# Patient Record
Sex: MALE | Race: WHITE | Employment: FULL TIME | URBAN - METROPOLITAN AREA
[De-identification: names, ages, dates, MRNs, and addresses within clinical notes are randomized per-mention and may not be internally consistent; named-entity substitution may affect disease eponyms.]

---

## 2019-10-23 ENCOUNTER — TRANSFERRED RECORDS (OUTPATIENT)
Dept: HEALTH INFORMATION MANAGEMENT | Facility: CLINIC | Age: 39
End: 2019-10-23

## 2019-10-24 ENCOUNTER — TRANSFERRED RECORDS (OUTPATIENT)
Dept: HEALTH INFORMATION MANAGEMENT | Facility: CLINIC | Age: 39
End: 2019-10-24

## 2019-10-25 ENCOUNTER — MEDICAL CORRESPONDENCE (OUTPATIENT)
Dept: HEALTH INFORMATION MANAGEMENT | Facility: CLINIC | Age: 39
End: 2019-10-25

## 2019-10-25 ENCOUNTER — TRANSFERRED RECORDS (OUTPATIENT)
Dept: HEALTH INFORMATION MANAGEMENT | Facility: CLINIC | Age: 39
End: 2019-10-25

## 2019-10-31 ENCOUNTER — PATIENT OUTREACH (OUTPATIENT)
Dept: NEUROSURGERY | Facility: CLINIC | Age: 39
End: 2019-10-31

## 2019-10-31 ENCOUNTER — OFFICE VISIT (OUTPATIENT)
Dept: NEUROSURGERY | Facility: CLINIC | Age: 39
End: 2019-10-31
Payer: COMMERCIAL

## 2019-10-31 VITALS
HEIGHT: 78 IN | BODY MASS INDEX: 21.52 KG/M2 | HEART RATE: 52 BPM | OXYGEN SATURATION: 100 % | DIASTOLIC BLOOD PRESSURE: 66 MMHG | WEIGHT: 186 LBS | SYSTOLIC BLOOD PRESSURE: 109 MMHG

## 2019-10-31 DIAGNOSIS — D35.2 PITUITARY MACROADENOMA (H): ICD-10-CM

## 2019-10-31 DIAGNOSIS — H49.21 SIXTH NERVE PALSY OF RIGHT EYE: ICD-10-CM

## 2019-10-31 DIAGNOSIS — E03.9 HYPOTHYROIDISM, UNSPECIFIED TYPE: Primary | ICD-10-CM

## 2019-10-31 RX ORDER — DEXAMETHASONE 2 MG/1
1 TABLET ORAL EVERY 6 HOURS
Refills: 1 | COMMUNITY
Start: 2019-10-25 | End: 2020-01-09

## 2019-10-31 RX ORDER — IBUPROFEN 200 MG
200 TABLET ORAL EVERY 4 HOURS PRN
Status: ON HOLD | COMMUNITY
End: 2019-11-04

## 2019-10-31 RX ORDER — ACETAMINOPHEN 500 MG
500-1000 TABLET ORAL EVERY 6 HOURS PRN
COMMUNITY

## 2019-10-31 RX ORDER — LEVOTHYROXINE SODIUM 50 UG/1
50 TABLET ORAL DAILY
Qty: 30 TABLET | Refills: 0 | Status: SHIPPED | OUTPATIENT
Start: 2019-10-31 | End: 2019-12-03

## 2019-10-31 SDOH — HEALTH STABILITY: MENTAL HEALTH: HOW OFTEN DO YOU HAVE A DRINK CONTAINING ALCOHOL?: 2-4 TIMES A MONTH

## 2019-10-31 SDOH — HEALTH STABILITY: MENTAL HEALTH: HOW MANY STANDARD DRINKS CONTAINING ALCOHOL DO YOU HAVE ON A TYPICAL DAY?: 5 OR 6

## 2019-10-31 SDOH — HEALTH STABILITY: MENTAL HEALTH: HOW MANY DRINKS CONTAINING ALCOHOL DO YOU HAVE ON A TYPICAL DAY WHEN YOU ARE DRINKING?: 5 OR 6

## 2019-10-31 ASSESSMENT — ENCOUNTER SYMPTOMS
ALTERED TEMPERATURE REGULATION: 0
WEIGHT GAIN: 0
INCREASED ENERGY: 0
LOSS OF CONSCIOUSNESS: 0
FATIGUE: 0
NUMBNESS: 0
PARALYSIS: 0
EYE IRRITATION: 1
HALLUCINATIONS: 0
POLYDIPSIA: 0
CHILLS: 0
EYE REDNESS: 0
TREMORS: 0
SEIZURES: 0
NIGHT SWEATS: 1
DISTURBANCES IN COORDINATION: 1
WEAKNESS: 0
FEVER: 0
MEMORY LOSS: 0
EYE WATERING: 0
TINGLING: 0
SPEECH CHANGE: 0
POLYPHAGIA: 0
DOUBLE VISION: 1
EYE PAIN: 1
HEADACHES: 1
WEIGHT LOSS: 0
DECREASED APPETITE: 1
DIZZINESS: 1

## 2019-10-31 ASSESSMENT — MIFFLIN-ST. JEOR: SCORE: 1895.91

## 2019-10-31 ASSESSMENT — PAIN SCALES - GENERAL: PAINLEVEL: NO PAIN (0)

## 2019-10-31 NOTE — NURSING NOTE
Chief Complaint   Patient presents with     Consult     [facility] NEW - PITUITARY TUMOR       Luis Felipe Choe, EMT

## 2019-10-31 NOTE — PROGRESS NOTES
Pre-op Teaching    Procedure: Pituitary resection  Planned Surgery Date: Unknown.  Either 11/1,2, or 3  Surgeon: Mikie                Discussed pre-op routine and requirements to include:  surgical procedure, post-op recovery and expectations, need for H&P, NPO prior to OR, pre-op antibacterial showers, pain control and importance of follow-up visits.  Surgery scheduling will coordinate OR time/date and update patient as appropriate.  The Pre-Admission Office will call to re-inforce instructions 24-48 hours prior to surgery. The Pre-Admission contact number is [phone], [phone] M-F.      Ample time was provided for patient questions and in-depth discussion of topics of heightened interest.  Reviewed medication list and provided instructions regarding what medications to stop prior to surgery: Ibuprofen.  MD prescribed Synthroid 50mcg to be taken daily.  Medication was e-prescribed to pt pharmacy in [place].  Pt aware.   Anti-bacterial soap solution for pre-op showers will be provided at PAC visit as well as specific instructions for use. Approximately 20 minutes spent with patient/family discussing and reviewing.      Patient provided triage contact number for questions or concerns that may arise prior to surgery. Pre-op folder with specific written instructions given to patient for review.

## 2019-10-31 NOTE — LETTER
"10/31/2019       RE: Luisito Asher  [address] [place] [address]     Dear Colleague,    Thank you for referring your patient, Luisito Asher, to the [facility] NEUROSURGERY at [facility]. Please see a copy of my visit note below.      Dear Dr. Cullen:    We saw Mr. Asher in Pituitary Clinic today for evaluation of a pituitary tumor and acute symptoms. He is here today with his wife. In summary, he is a 39 year old right handed man who awoke with sudden onset of headache one week ago, There was associated nausea but no vomiting. The headache was mostly in the temples and there was pressure behind the right eye. About two days later, he developed double vision which he describes as \"freezing\" of the eyes. He was seen in an ED when he developed the severe headache and imaging was performed. It is not clear if he had a cranial nerve palsy at the time of the ED visit. He was then seen by neurosurgery at [facility] and follow up studies and outpatient follow up was apparently recommended. He has been on Tylenol, ibuprofen, dexamethasone.     His headache has improved since last week. The nausea has resolved. His appetite is good. He has deliberately lost 35 lbs over the past year on a \"Keto\" diet. He has been increasingly fatigued over the past month. His libido has been low for several months. He still is able to have erections. He describes feeling cold more often recently.     He denies peripheral vision loss. He has some visual blurring on the right.     PMH: Right knee MCL/ACL repair, tonsillectomy, wisdom tooth extraction, bilateral LASIK    FH: No known intracranial tumors    Social: He lives in [place], [place]. He is  and has three children. 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    On exam, his general appearance is notable for a right eye patch. He is thin. He has a receding hair line. He appears comfortable and has no photophobia. He " has a right sixth nerve palsy. No ptosis or proptosis. Pupils are equal and reactive. No APD. Face is symmetric. Speech and language are normal. He has normal strength and coordination throughout. Visual fields are full on confrontation.    We reviewed his MRI with him and with wife. He has a hypoenhancing mass in the sella with suprasellar and right parasellar extension. There is some compression of the right parasellar space. There is contact with but not marked compression of the optic apparatus. The floor of the sella is expanded. The tumor is less than 3 cm in size. The gland appears to be displaced superiorly, posteriorly, and to the left.     His testosterone is low at 70. Free T4 is mildly low at 0.6. Prolactin 4.5; IGF is mildly elevated at 301     Impression/Plan: This patient has a symptomatic pituitary adenoma. 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 He has mild hypothyroidism and hypogonadism. Although he has mildly elevated IGF-1, he does not have acromegaly. We recommend urgent resection of the tumor through an endoscopic, transnasal approach. We discussed favorable visual prognosis since his onset was only a few days ago and the other cranial nerves are not involved. His double vision may take several weeks or months to improve. His best chance at recovery is early surgery.     We discussed the risks of surgery including death, carotid injury, stroke, hemorrhage, failure to improve, CSF leak, panhypopituitarism, need for reoperation, and he agrees to proceed. We will start him on a low dose of levothyroxine.    We will aim for surgery tomorrow. We will keep you informed of his progress. Please do not hesitate to contact us with questions.    KATI MORRELL MD

## 2019-11-01 ENCOUNTER — APPOINTMENT (OUTPATIENT)
Dept: CT IMAGING | Facility: CLINIC | Age: 39
End: 2019-11-01
Attending: NEUROLOGICAL SURGERY
Payer: COMMERCIAL

## 2019-11-01 ENCOUNTER — ANESTHESIA (OUTPATIENT)
Dept: SURGERY | Facility: CLINIC | Age: 39
End: 2019-11-01
Payer: COMMERCIAL

## 2019-11-01 ENCOUNTER — PATIENT OUTREACH (OUTPATIENT)
Dept: NEUROSURGERY | Facility: CLINIC | Age: 39
End: 2019-11-01

## 2019-11-01 ENCOUNTER — HOSPITAL ENCOUNTER (INPATIENT)
Facility: CLINIC | Age: 39
LOS: 3 days | Discharge: HOME | End: 2019-11-04
Attending: NEUROLOGICAL SURGERY | Admitting: NEUROLOGICAL SURGERY
Payer: COMMERCIAL

## 2019-11-01 ENCOUNTER — ANESTHESIA EVENT (OUTPATIENT)
Dept: SURGERY | Facility: CLINIC | Age: 39
End: 2019-11-01
Payer: COMMERCIAL

## 2019-11-01 DIAGNOSIS — D35.3 BENIGN NEOPLASM OF PITUITARY GLAND AND CRANIOPHARYNGEAL DUCT (POUCH) (H): Primary | ICD-10-CM

## 2019-11-01 DIAGNOSIS — E87.1 HYPONATREMIA: ICD-10-CM

## 2019-11-01 DIAGNOSIS — D49.7 PITUITARY TUMOR: Primary | ICD-10-CM

## 2019-11-01 DIAGNOSIS — D35.2 BENIGN NEOPLASM OF PITUITARY GLAND AND CRANIOPHARYNGEAL DUCT (POUCH) (H): Primary | ICD-10-CM

## 2019-11-01 LAB
ABO + RH BLD: NORMAL
ABO + RH BLD: NORMAL
ANION GAP SERPL CALCULATED.3IONS-SCNC: 5 MMOL/L (ref 3–14)
APTT PPP: 30 SEC (ref 22–37)
BLD GP AB SCN SERPL QL: NORMAL
BLOOD BANK CMNT PATIENT-IMP: NORMAL
BUN SERPL-MCNC: 29 MG/DL (ref 7–30)
CALCIUM SERPL-MCNC: 9.1 MG/DL (ref 8.5–10.1)
CHLORIDE SERPL-SCNC: 105 MMOL/L (ref 94–109)
CO2 SERPL-SCNC: 27 MMOL/L (ref 20–32)
CREAT SERPL-MCNC: 0.98 MG/DL (ref 0.66–1.25)
ERYTHROCYTE [DISTWIDTH] IN BLOOD BY AUTOMATED COUNT: 13 % (ref 10–15)
GFR SERPL CREATININE-BSD FRML MDRD: >90 ML/MIN/{1.73_M2}
GLUCOSE BLDC GLUCOMTR-MCNC: 80 MG/DL (ref 70–99)
GLUCOSE SERPL-MCNC: 85 MG/DL (ref 70–99)
HCT VFR BLD AUTO: 43 % (ref 40–53)
HGB BLD-MCNC: 14.2 G/DL (ref 13.3–17.7)
INR PPP: 1.03 (ref 0.86–1.14)
MCH RBC QN AUTO: 29.2 PG (ref 26.5–33)
MCHC RBC AUTO-ENTMCNC: 33 G/DL (ref 31.5–36.5)
MCV RBC AUTO: 88 FL (ref 78–100)
PLATELET # BLD AUTO: 254 10E9/L (ref 150–450)
POTASSIUM SERPL-SCNC: 3.9 MMOL/L (ref 3.4–5.3)
RBC # BLD AUTO: 4.87 10E12/L (ref 4.4–5.9)
SODIUM SERPL-SCNC: 137 MMOL/L (ref 133–144)
SPECIMEN EXP DATE BLD: NORMAL
WBC # BLD AUTO: 12 10E9/L (ref 4–11)

## 2019-11-01 PROCEDURE — 80048 BASIC METABOLIC PNL TOTAL CA: CPT | Performed by: NEUROLOGICAL SURGERY

## 2019-11-01 PROCEDURE — 0GB04ZZ EXCISION OF PITUITARY GLAND, PERCUTANEOUS ENDOSCOPIC APPROACH: ICD-10-PCS | Performed by: NEUROLOGICAL SURGERY

## 2019-11-01 PROCEDURE — 25000128 H RX IP 250 OP 636: Performed by: NURSE ANESTHETIST, CERTIFIED REGISTERED

## 2019-11-01 PROCEDURE — 25800030 ZZH RX IP 258 OP 636: Performed by: NURSE ANESTHETIST, CERTIFIED REGISTERED

## 2019-11-01 PROCEDURE — 37000009 ZZH ANESTHESIA TECHNICAL FEE, EACH ADDTL 15 MIN: Performed by: NEUROLOGICAL SURGERY

## 2019-11-01 PROCEDURE — 27210794 ZZH OR GENERAL SUPPLY STERILE: Performed by: NEUROLOGICAL SURGERY

## 2019-11-01 PROCEDURE — 25800025 ZZH RX 258: Performed by: NEUROLOGICAL SURGERY

## 2019-11-01 PROCEDURE — 88342 IMHCHEM/IMCYTCHM 1ST ANTB: CPT | Performed by: NEUROLOGICAL SURGERY

## 2019-11-01 PROCEDURE — 27210995 ZZH RX 272: Performed by: NEUROLOGICAL SURGERY

## 2019-11-01 PROCEDURE — 36415 COLL VENOUS BLD VENIPUNCTURE: CPT | Performed by: NEUROLOGICAL SURGERY

## 2019-11-01 PROCEDURE — 00000146 ZZHCL STATISTIC GLUCOSE BY METER IP

## 2019-11-01 PROCEDURE — 70460 CT HEAD/BRAIN W/DYE: CPT

## 2019-11-01 PROCEDURE — 25000128 H RX IP 250 OP 636: Performed by: STUDENT IN AN ORGANIZED HEALTH CARE EDUCATION/TRAINING PROGRAM

## 2019-11-01 PROCEDURE — 25000125 ZZHC RX 250: Performed by: OTOLARYNGOLOGY

## 2019-11-01 PROCEDURE — C1762 CONN TISS, HUMAN(INC FASCIA): HCPCS | Performed by: NEUROLOGICAL SURGERY

## 2019-11-01 PROCEDURE — 40000014 ZZH STATISTIC ARTERIAL MONITORING DAILY

## 2019-11-01 PROCEDURE — 86900 BLOOD TYPING SEROLOGIC ABO: CPT | Performed by: ANESTHESIOLOGY

## 2019-11-01 PROCEDURE — 85610 PROTHROMBIN TIME: CPT | Performed by: NEUROLOGICAL SURGERY

## 2019-11-01 PROCEDURE — 88305 TISSUE EXAM BY PATHOLOGIST: CPT | Performed by: NEUROLOGICAL SURGERY

## 2019-11-01 PROCEDURE — 36000074 ZZH SURGERY LEVEL 6 1ST 30 MIN - UMMC: Performed by: NEUROLOGICAL SURGERY

## 2019-11-01 PROCEDURE — 86901 BLOOD TYPING SEROLOGIC RH(D): CPT | Performed by: ANESTHESIOLOGY

## 2019-11-01 PROCEDURE — 25000125 ZZHC RX 250: Performed by: STUDENT IN AN ORGANIZED HEALTH CARE EDUCATION/TRAINING PROGRAM

## 2019-11-01 PROCEDURE — 71000014 ZZH RECOVERY PHASE 1 LEVEL 2 FIRST HR: Performed by: NEUROLOGICAL SURGERY

## 2019-11-01 PROCEDURE — 36000076 ZZH SURGERY LEVEL 6 EA 15 ADDTL MIN - UMMC: Performed by: NEUROLOGICAL SURGERY

## 2019-11-01 PROCEDURE — 25000125 ZZHC RX 250: Performed by: NURSE ANESTHETIST, CERTIFIED REGISTERED

## 2019-11-01 PROCEDURE — 27210995 ZZH RX 272: Performed by: OTOLARYNGOLOGY

## 2019-11-01 PROCEDURE — 40000275 ZZH STATISTIC RCP TIME EA 10 MIN

## 2019-11-01 PROCEDURE — 25000128 H RX IP 250 OP 636: Performed by: NEUROLOGICAL SURGERY

## 2019-11-01 PROCEDURE — 88341 IMHCHEM/IMCYTCHM EA ADD ANTB: CPT | Performed by: NEUROLOGICAL SURGERY

## 2019-11-01 PROCEDURE — 71000015 ZZH RECOVERY PHASE 1 LEVEL 2 EA ADDTL HR: Performed by: NEUROLOGICAL SURGERY

## 2019-11-01 PROCEDURE — 88313 SPECIAL STAINS GROUP 2: CPT | Performed by: NEUROLOGICAL SURGERY

## 2019-11-01 PROCEDURE — 12000001 ZZH R&B MED SURG/OB UMMC

## 2019-11-01 PROCEDURE — 37000008 ZZH ANESTHESIA TECHNICAL FEE, 1ST 30 MIN: Performed by: NEUROLOGICAL SURGERY

## 2019-11-01 PROCEDURE — 85730 THROMBOPLASTIN TIME PARTIAL: CPT | Performed by: NEUROLOGICAL SURGERY

## 2019-11-01 PROCEDURE — 86850 RBC ANTIBODY SCREEN: CPT | Performed by: ANESTHESIOLOGY

## 2019-11-01 PROCEDURE — 25000565 ZZH ISOFLURANE, EA 15 MIN: Performed by: NEUROLOGICAL SURGERY

## 2019-11-01 PROCEDURE — 40000170 ZZH STATISTIC PRE-PROCEDURE ASSESSMENT II: Performed by: NEUROLOGICAL SURGERY

## 2019-11-01 PROCEDURE — 85027 COMPLETE CBC AUTOMATED: CPT | Performed by: NEUROLOGICAL SURGERY

## 2019-11-01 DEVICE — GRAFT DUREPAIR DURA MATRIX 2X2" 62100: Type: IMPLANTABLE DEVICE | Site: BRAIN | Status: FUNCTIONAL

## 2019-11-01 RX ORDER — PROPOFOL 10 MG/ML
INJECTION, EMULSION INTRAVENOUS CONTINUOUS PRN
Status: DISCONTINUED | OUTPATIENT
Start: 2019-11-01 | End: 2019-11-01

## 2019-11-01 RX ORDER — LABETALOL HYDROCHLORIDE 5 MG/ML
10-40 INJECTION, SOLUTION INTRAVENOUS EVERY 10 MIN PRN
Status: DISCONTINUED | OUTPATIENT
Start: 2019-11-01 | End: 2019-11-04 | Stop reason: HOSPADM

## 2019-11-01 RX ORDER — ACETAMINOPHEN 325 MG/1
975 TABLET ORAL EVERY 8 HOURS
Status: DISCONTINUED | OUTPATIENT
Start: 2019-11-01 | End: 2019-11-04 | Stop reason: HOSPADM

## 2019-11-01 RX ORDER — HYDROMORPHONE HYDROCHLORIDE 1 MG/ML
.3-.5 INJECTION, SOLUTION INTRAMUSCULAR; INTRAVENOUS; SUBCUTANEOUS EVERY 5 MIN PRN
Status: DISCONTINUED | OUTPATIENT
Start: 2019-11-01 | End: 2019-11-02

## 2019-11-01 RX ORDER — POTASSIUM CHLORIDE 7.45 MG/ML
10 INJECTION INTRAVENOUS
Status: DISCONTINUED | OUTPATIENT
Start: 2019-11-01 | End: 2019-11-04 | Stop reason: HOSPADM

## 2019-11-01 RX ORDER — LIDOCAINE HYDROCHLORIDE AND EPINEPHRINE 10; 10 MG/ML; UG/ML
INJECTION, SOLUTION INFILTRATION; PERINEURAL PRN
Status: DISCONTINUED | OUTPATIENT
Start: 2019-11-01 | End: 2019-11-02 | Stop reason: HOSPADM

## 2019-11-01 RX ORDER — AMOXICILLIN 250 MG
2 CAPSULE ORAL 2 TIMES DAILY
Status: DISCONTINUED | OUTPATIENT
Start: 2019-11-01 | End: 2019-11-04 | Stop reason: HOSPADM

## 2019-11-01 RX ORDER — POTASSIUM CL/LIDO/0.9 % NACL 10MEQ/0.1L
10 INTRAVENOUS SOLUTION, PIGGYBACK (ML) INTRAVENOUS
Status: DISCONTINUED | OUTPATIENT
Start: 2019-11-01 | End: 2019-11-04 | Stop reason: HOSPADM

## 2019-11-01 RX ORDER — GLYCOPYRROLATE 0.2 MG/ML
INJECTION, SOLUTION INTRAMUSCULAR; INTRAVENOUS PRN
Status: DISCONTINUED | OUTPATIENT
Start: 2019-11-01 | End: 2019-11-01

## 2019-11-01 RX ORDER — FENTANYL CITRATE 50 UG/ML
25-50 INJECTION, SOLUTION INTRAMUSCULAR; INTRAVENOUS
Status: DISCONTINUED | OUTPATIENT
Start: 2019-11-01 | End: 2019-11-04 | Stop reason: HOSPADM

## 2019-11-01 RX ORDER — SODIUM CHLORIDE 9 MG/ML
INJECTION, SOLUTION INTRAVENOUS CONTINUOUS
Status: ACTIVE | OUTPATIENT
Start: 2019-11-01 | End: 2019-11-02

## 2019-11-01 RX ORDER — AMOXICILLIN 250 MG
1 CAPSULE ORAL 2 TIMES DAILY
Status: DISCONTINUED | OUTPATIENT
Start: 2019-11-01 | End: 2019-11-04 | Stop reason: HOSPADM

## 2019-11-01 RX ORDER — PROCHLORPERAZINE MALEATE 10 MG
10 TABLET ORAL EVERY 6 HOURS PRN
Status: DISCONTINUED | OUTPATIENT
Start: 2019-11-01 | End: 2019-11-04 | Stop reason: HOSPADM

## 2019-11-01 RX ORDER — ONDANSETRON 4 MG/1
4 TABLET, ORALLY DISINTEGRATING ORAL EVERY 30 MIN PRN
Status: DISCONTINUED | OUTPATIENT
Start: 2019-11-01 | End: 2019-11-02

## 2019-11-01 RX ORDER — ONDANSETRON 2 MG/ML
4 INJECTION INTRAMUSCULAR; INTRAVENOUS EVERY 30 MIN PRN
Status: DISCONTINUED | OUTPATIENT
Start: 2019-11-01 | End: 2019-11-02

## 2019-11-01 RX ORDER — NALOXONE HYDROCHLORIDE 0.4 MG/ML
.1-.4 INJECTION, SOLUTION INTRAMUSCULAR; INTRAVENOUS; SUBCUTANEOUS
Status: DISCONTINUED | OUTPATIENT
Start: 2019-11-01 | End: 2019-11-04 | Stop reason: HOSPADM

## 2019-11-01 RX ORDER — LEVOTHYROXINE SODIUM 50 UG/1
50 TABLET ORAL DAILY
Status: DISCONTINUED | OUTPATIENT
Start: 2019-11-02 | End: 2019-11-04 | Stop reason: HOSPADM

## 2019-11-01 RX ORDER — DEXAMETHASONE 2 MG/1
2 TABLET ORAL EVERY 6 HOURS
Status: DISCONTINUED | OUTPATIENT
Start: 2019-11-01 | End: 2019-11-04 | Stop reason: HOSPADM

## 2019-11-01 RX ORDER — LIDOCAINE HYDROCHLORIDE 20 MG/ML
INJECTION, SOLUTION INFILTRATION; PERINEURAL PRN
Status: DISCONTINUED | OUTPATIENT
Start: 2019-11-01 | End: 2019-11-01

## 2019-11-01 RX ORDER — POTASSIUM CHLORIDE 750 MG/1
20-40 TABLET, EXTENDED RELEASE ORAL
Status: DISCONTINUED | OUTPATIENT
Start: 2019-11-01 | End: 2019-11-04 | Stop reason: HOSPADM

## 2019-11-01 RX ORDER — MEPERIDINE HYDROCHLORIDE 25 MG/ML
12.5 INJECTION INTRAMUSCULAR; INTRAVENOUS; SUBCUTANEOUS EVERY 5 MIN PRN
Status: DISCONTINUED | OUTPATIENT
Start: 2019-11-01 | End: 2019-11-04 | Stop reason: HOSPADM

## 2019-11-01 RX ORDER — ACETAMINOPHEN 325 MG/1
975 TABLET ORAL ONCE
Status: COMPLETED | OUTPATIENT
Start: 2019-11-01 | End: 2019-11-02

## 2019-11-01 RX ORDER — SODIUM CHLORIDE, SODIUM LACTATE, POTASSIUM CHLORIDE, CALCIUM CHLORIDE 600; 310; 30; 20 MG/100ML; MG/100ML; MG/100ML; MG/100ML
INJECTION, SOLUTION INTRAVENOUS CONTINUOUS PRN
Status: DISCONTINUED | OUTPATIENT
Start: 2019-11-01 | End: 2019-11-01

## 2019-11-01 RX ORDER — OXYMETAZOLINE HYDROCHLORIDE 0.05 G/100ML
SPRAY NASAL PRN
Status: DISCONTINUED | OUTPATIENT
Start: 2019-11-01 | End: 2019-11-02 | Stop reason: HOSPADM

## 2019-11-01 RX ORDER — SODIUM CHLORIDE, SODIUM LACTATE, POTASSIUM CHLORIDE, CALCIUM CHLORIDE 600; 310; 30; 20 MG/100ML; MG/100ML; MG/100ML; MG/100ML
INJECTION, SOLUTION INTRAVENOUS CONTINUOUS
Status: DISCONTINUED | OUTPATIENT
Start: 2019-11-01 | End: 2019-11-04 | Stop reason: HOSPADM

## 2019-11-01 RX ORDER — HYDRALAZINE HYDROCHLORIDE 20 MG/ML
10-20 INJECTION INTRAMUSCULAR; INTRAVENOUS EVERY 30 MIN PRN
Status: DISCONTINUED | OUTPATIENT
Start: 2019-11-01 | End: 2019-11-04 | Stop reason: HOSPADM

## 2019-11-01 RX ORDER — DIMENHYDRINATE 50 MG/ML
25 INJECTION, SOLUTION INTRAMUSCULAR; INTRAVENOUS
Status: DISCONTINUED | OUTPATIENT
Start: 2019-11-01 | End: 2019-11-04 | Stop reason: HOSPADM

## 2019-11-01 RX ORDER — POTASSIUM CHLORIDE 1.5 G/1.58G
20-40 POWDER, FOR SOLUTION ORAL
Status: DISCONTINUED | OUTPATIENT
Start: 2019-11-01 | End: 2019-11-04 | Stop reason: HOSPADM

## 2019-11-01 RX ORDER — PANTOPRAZOLE SODIUM 40 MG/1
40 TABLET, DELAYED RELEASE ORAL
Status: DISCONTINUED | OUTPATIENT
Start: 2019-11-02 | End: 2019-11-04 | Stop reason: HOSPADM

## 2019-11-01 RX ORDER — LIDOCAINE 40 MG/G
CREAM TOPICAL
Status: DISCONTINUED | OUTPATIENT
Start: 2019-11-01 | End: 2019-11-04 | Stop reason: HOSPADM

## 2019-11-01 RX ORDER — DEXAMETHASONE SODIUM PHOSPHATE 4 MG/ML
4 INJECTION, SOLUTION INTRA-ARTICULAR; INTRALESIONAL; INTRAMUSCULAR; INTRAVENOUS; SOFT TISSUE
Status: DISCONTINUED | OUTPATIENT
Start: 2019-11-01 | End: 2019-11-04 | Stop reason: HOSPADM

## 2019-11-01 RX ORDER — ONDANSETRON 4 MG/1
4 TABLET, ORALLY DISINTEGRATING ORAL EVERY 6 HOURS PRN
Status: DISCONTINUED | OUTPATIENT
Start: 2019-11-01 | End: 2019-11-04 | Stop reason: HOSPADM

## 2019-11-01 RX ORDER — LABETALOL 20 MG/4 ML (5 MG/ML) INTRAVENOUS SYRINGE
10
Status: DISCONTINUED | OUTPATIENT
Start: 2019-11-01 | End: 2019-11-04 | Stop reason: HOSPADM

## 2019-11-01 RX ORDER — ACETAMINOPHEN 325 MG/1
650 TABLET ORAL EVERY 4 HOURS PRN
Status: DISCONTINUED | OUTPATIENT
Start: 2019-11-04 | End: 2019-11-04 | Stop reason: HOSPADM

## 2019-11-01 RX ORDER — ESMOLOL HYDROCHLORIDE 10 MG/ML
INJECTION INTRAVENOUS PRN
Status: DISCONTINUED | OUTPATIENT
Start: 2019-11-01 | End: 2019-11-01

## 2019-11-01 RX ORDER — ONDANSETRON 2 MG/ML
4 INJECTION INTRAMUSCULAR; INTRAVENOUS EVERY 6 HOURS PRN
Status: DISCONTINUED | OUTPATIENT
Start: 2019-11-01 | End: 2019-11-04 | Stop reason: HOSPADM

## 2019-11-01 RX ORDER — METOCLOPRAMIDE 10 MG/1
10 TABLET ORAL EVERY 6 HOURS PRN
Status: DISCONTINUED | OUTPATIENT
Start: 2019-11-01 | End: 2019-11-04 | Stop reason: HOSPADM

## 2019-11-01 RX ORDER — HYDRALAZINE HYDROCHLORIDE 20 MG/ML
2.5-5 INJECTION INTRAMUSCULAR; INTRAVENOUS EVERY 10 MIN PRN
Status: DISCONTINUED | OUTPATIENT
Start: 2019-11-01 | End: 2019-11-04 | Stop reason: HOSPADM

## 2019-11-01 RX ORDER — LIDOCAINE 40 MG/G
CREAM TOPICAL
Status: DISCONTINUED | OUTPATIENT
Start: 2019-11-01 | End: 2019-11-01 | Stop reason: HOSPADM

## 2019-11-01 RX ORDER — METOCLOPRAMIDE HYDROCHLORIDE 5 MG/ML
10 INJECTION INTRAMUSCULAR; INTRAVENOUS EVERY 6 HOURS PRN
Status: DISCONTINUED | OUTPATIENT
Start: 2019-11-01 | End: 2019-11-04 | Stop reason: HOSPADM

## 2019-11-01 RX ORDER — ONDANSETRON 2 MG/ML
INJECTION INTRAMUSCULAR; INTRAVENOUS PRN
Status: DISCONTINUED | OUTPATIENT
Start: 2019-11-01 | End: 2019-11-01

## 2019-11-01 RX ORDER — POTASSIUM CHLORIDE 29.8 MG/ML
20 INJECTION INTRAVENOUS
Status: DISCONTINUED | OUTPATIENT
Start: 2019-11-01 | End: 2019-11-04 | Stop reason: HOSPADM

## 2019-11-01 RX ORDER — PROPOFOL 10 MG/ML
INJECTION, EMULSION INTRAVENOUS PRN
Status: DISCONTINUED | OUTPATIENT
Start: 2019-11-01 | End: 2019-11-01

## 2019-11-01 RX ORDER — IOPAMIDOL 755 MG/ML
75 INJECTION, SOLUTION INTRAVASCULAR ONCE
Status: COMPLETED | OUTPATIENT
Start: 2019-11-01 | End: 2019-11-01

## 2019-11-01 RX ORDER — CEFTRIAXONE 1 G/1
1 INJECTION, POWDER, FOR SOLUTION INTRAMUSCULAR; INTRAVENOUS
Status: COMPLETED | OUTPATIENT
Start: 2019-11-01 | End: 2019-11-01

## 2019-11-01 RX ORDER — SODIUM CHLORIDE, SODIUM LACTATE, POTASSIUM CHLORIDE, AND CALCIUM CHLORIDE .6; .31; .03; .02 G/100ML; G/100ML; G/100ML; G/100ML
IRRIGANT IRRIGATION PRN
Status: DISCONTINUED | OUTPATIENT
Start: 2019-11-01 | End: 2019-11-02 | Stop reason: HOSPADM

## 2019-11-01 RX ORDER — PROCHLORPERAZINE 25 MG
25 SUPPOSITORY, RECTAL RECTAL EVERY 12 HOURS PRN
Status: DISCONTINUED | OUTPATIENT
Start: 2019-11-01 | End: 2019-11-04 | Stop reason: HOSPADM

## 2019-11-01 RX ORDER — MAGNESIUM SULFATE HEPTAHYDRATE 40 MG/ML
4 INJECTION, SOLUTION INTRAVENOUS EVERY 4 HOURS PRN
Status: DISCONTINUED | OUTPATIENT
Start: 2019-11-01 | End: 2019-11-04 | Stop reason: HOSPADM

## 2019-11-01 RX ADMIN — MIDAZOLAM 2 MG: 1 INJECTION INTRAMUSCULAR; INTRAVENOUS at 17:40

## 2019-11-01 RX ADMIN — ROCURONIUM BROMIDE 50 MG: 10 INJECTION INTRAVENOUS at 18:21

## 2019-11-01 RX ADMIN — PHENYLEPHRINE HYDROCHLORIDE 100 MCG: 10 INJECTION INTRAVENOUS at 18:08

## 2019-11-01 RX ADMIN — PROPOFOL 40 MG: 10 INJECTION, EMULSION INTRAVENOUS at 19:15

## 2019-11-01 RX ADMIN — ROCURONIUM BROMIDE 20 MG: 10 INJECTION INTRAVENOUS at 21:44

## 2019-11-01 RX ADMIN — SODIUM CHLORIDE, POTASSIUM CHLORIDE, SODIUM LACTATE AND CALCIUM CHLORIDE: 600; 310; 30; 20 INJECTION, SOLUTION INTRAVENOUS at 19:49

## 2019-11-01 RX ADMIN — SUGAMMADEX 170 MG: 100 INJECTION, SOLUTION INTRAVENOUS at 22:33

## 2019-11-01 RX ADMIN — SODIUM CHLORIDE, POTASSIUM CHLORIDE, SODIUM LACTATE AND CALCIUM CHLORIDE: 600; 310; 30; 20 INJECTION, SOLUTION INTRAVENOUS at 19:00

## 2019-11-01 RX ADMIN — IOPAMIDOL 75 ML: 755 INJECTION, SOLUTION INTRAVENOUS at 17:13

## 2019-11-01 RX ADMIN — HYDROCORTISONE SODIUM SUCCINATE 100 MG: 100 INJECTION, POWDER, FOR SOLUTION INTRAMUSCULAR; INTRAVENOUS at 17:50

## 2019-11-01 RX ADMIN — GLYCOPYRROLATE 0.2 MG: 0.2 INJECTION, SOLUTION INTRAMUSCULAR; INTRAVENOUS at 17:51

## 2019-11-01 RX ADMIN — ESMOLOL HYDROCHLORIDE 30 MG: 10 INJECTION, SOLUTION INTRAVENOUS at 18:24

## 2019-11-01 RX ADMIN — CEFTRIAXONE 1 G: 1 INJECTION, POWDER, FOR SOLUTION INTRAMUSCULAR; INTRAVENOUS at 18:30

## 2019-11-01 RX ADMIN — ROCURONIUM BROMIDE 50 MG: 10 INJECTION INTRAVENOUS at 18:00

## 2019-11-01 RX ADMIN — ESMOLOL HYDROCHLORIDE 40 MG: 10 INJECTION, SOLUTION INTRAVENOUS at 18:59

## 2019-11-01 RX ADMIN — ROCURONIUM BROMIDE 10 MG: 10 INJECTION INTRAVENOUS at 20:26

## 2019-11-01 RX ADMIN — PROPOFOL 40 MG: 10 INJECTION, EMULSION INTRAVENOUS at 22:09

## 2019-11-01 RX ADMIN — MEPERIDINE HYDROCHLORIDE 12.5 MG: 25 INJECTION INTRAMUSCULAR; INTRAVENOUS; SUBCUTANEOUS at 23:18

## 2019-11-01 RX ADMIN — PROPOFOL 100 MG: 10 INJECTION, EMULSION INTRAVENOUS at 18:20

## 2019-11-01 RX ADMIN — SODIUM CHLORIDE, POTASSIUM CHLORIDE, SODIUM LACTATE AND CALCIUM CHLORIDE: 600; 310; 30; 20 INJECTION, SOLUTION INTRAVENOUS at 17:40

## 2019-11-01 RX ADMIN — ESMOLOL HYDROCHLORIDE 30 MG: 10 INJECTION, SOLUTION INTRAVENOUS at 18:22

## 2019-11-01 RX ADMIN — PROPOFOL 30 MG: 10 INJECTION, EMULSION INTRAVENOUS at 19:29

## 2019-11-01 RX ADMIN — PROPOFOL 100 MG: 10 INJECTION, EMULSION INTRAVENOUS at 18:21

## 2019-11-01 RX ADMIN — PROPOFOL 50 MCG/KG/MIN: 10 INJECTION, EMULSION INTRAVENOUS at 21:45

## 2019-11-01 RX ADMIN — ROCURONIUM BROMIDE 10 MG: 10 INJECTION INTRAVENOUS at 20:19

## 2019-11-01 RX ADMIN — LIDOCAINE HYDROCHLORIDE 100 MG: 20 INJECTION, SOLUTION INFILTRATION; PERINEURAL at 18:00

## 2019-11-01 RX ADMIN — SODIUM CHLORIDE, POTASSIUM CHLORIDE, SODIUM LACTATE AND CALCIUM CHLORIDE: 600; 310; 30; 20 INJECTION, SOLUTION INTRAVENOUS at 18:30

## 2019-11-01 RX ADMIN — PROPOFOL 200 MG: 10 INJECTION, EMULSION INTRAVENOUS at 18:00

## 2019-11-01 RX ADMIN — ONDANSETRON 4 MG: 2 INJECTION INTRAMUSCULAR; INTRAVENOUS at 17:50

## 2019-11-01 RX ADMIN — ROCURONIUM BROMIDE 20 MG: 10 INJECTION INTRAVENOUS at 20:52

## 2019-11-01 RX ADMIN — ROCURONIUM BROMIDE 20 MG: 10 INJECTION INTRAVENOUS at 18:52

## 2019-11-01 ASSESSMENT — MIFFLIN-ST. JEOR: SCORE: 1787

## 2019-11-01 NOTE — PROGRESS NOTES
MD called the writer.  Pt is to be at the hospital at 1500.  Surgery will be done as an add on and approximate start time is 1600.  Message relayed to pt and he agreed to the plan.  Pt expressed understanding of the information given.

## 2019-11-01 NOTE — OR NURSING
Pre Procedure care completed. Pt went for head CT. Blood drawn for multiple labs.   Wife at bedside,giving emotional support.

## 2019-11-01 NOTE — ANESTHESIA PREPROCEDURE EVALUATION
Anesthesia Pre-Procedure Evaluation    Patient: Luisito Asher   MRN:     [id number] Gender:   male   Age:    39 year old :      1980        Preoperative Diagnosis: * No pre-op diagnosis entered *   Procedure(s):  Endoscopic transnasal resection of tumor     History reviewed. No pertinent past medical history.   History reviewed. No pertinent surgical history.       Anesthesia Evaluation     . Pt has had prior anesthetic. Type: General           ROS/MED HX    ENT/Pulmonary:  - neg pulmonary ROS     Neurologic:  - neg neurologic ROS     Cardiovascular:  - neg cardiovascular ROS       METS/Exercise Tolerance:     Hematologic:         Musculoskeletal:         GI/Hepatic:  - neg GI/hepatic ROS       Renal/Genitourinary:  - ROS Renal section negative       Endo:  - neg endo ROS       Psychiatric:  - neg psychiatric ROS       Infectious Disease:  - neg infectious disease ROS       Malignancy:      - no malignancy   Other:                         PHYSICAL EXAM:   Mental Status/Neuro: A/A/O   Airway: Facies: Feasible  Mallampati: I  Mouth/Opening: Full  TM distance: > 6 cm  Neck ROM: Full   Respiratory: Auscultation: CTAB     Resp. Rate: Normal     Resp. Effort: Normal      CV: Rhythm: Regular  Rate: Age appropriate  Heart: Normal Sounds  Edema: None   Comments:      Dental: Normal Dentition                LABS:  CBC:   Lab Results   Component Value Date    WBC 12.0 (H) 2019    HGB 14.2 2019    HCT 43.0 2019     2019     BMP: No results found for: NA, POTASSIUM, CHLORIDE, CO2, BUN, CR, GLC  COAGS:   Lab Results   Component Value Date    PTT 30 2019    INR 1.03 2019     POC:   Lab Results   Component Value Date    BGM 80 2019     OTHER: No results found for: PH, LACT, A1C, MARK, PHOS, MAG, ALBUMIN, PROTTOTAL, ALT, AST, GGT, ALKPHOS, BILITOTAL, BILIDIRECT, LIPASE, AMYLASE, MALCOM, TSH, T4, T3, CRP, SED     Preop Vitals    BP Readings from Last 3 Encounters:   19  115/85   10/31/19 109/66    Pulse Readings from Last 3 Encounters:   11/01/19 (!) 49   10/31/19 52      Resp Readings from Last 3 Encounters:   11/01/19 16    SpO2 Readings from Last 3 Encounters:   11/01/19 100%   10/31/19 100%      Temp Readings from Last 1 Encounters:   11/01/19 36.7  C (98  F) (Oral)    Ht Readings from Last 1 Encounters:   11/01/19 1.829 m (6')      Wt Readings from Last 1 Encounters:   11/01/19 83.4 kg (183 lb 13.8 oz)    Estimated body mass index is 24.94 kg/m  as calculated from the following:    Height as of this encounter: 1.829 m (6').    Weight as of this encounter: 83.4 kg (183 lb 13.8 oz).     LDA:  Peripheral IV 11/01/19 Left Hand (Active)   Site Assessment WDL 11/1/2019  4:49 PM   Line Status Saline locked 11/1/2019  4:49 PM   Phlebitis Scale 0-->no symptoms 11/1/2019  4:49 PM   Infiltration Scale 0 11/1/2019  4:49 PM   Infiltration Site Treatment Method  None 11/1/2019  4:49 PM   Extravasation? No 11/1/2019  4:49 PM   Dressing Intervention New dressing  11/1/2019  4:49 PM   Number of days: 0        Assessment:   ASA SCORE: 3    H&P: History and physical reviewed and following examination; no interval change.         Plan:   Anes. Type:  General   Pre-Medication: None   Induction:  IV (Standard)   Airway: ETT; Oral; CMAC/VL   Access/Monitoring: PIV; A-Line; 2nd PIV   Maintenance: Balanced     Postop Plan:   Postop Pain: Opioids  Postop Sedation/Airway: Not planned  Disposition: Inpatient/Admit; ICU     PONV Management:   Adult Risk Factors:, Postop Opioids   Prevention: Ondansetron, Dexamethasone     CONSENT: Direct conversation   Plan and risks discussed with: Patient   Blood Products: Consented (ALL Blood Products)                   Hans Davis MD

## 2019-11-01 NOTE — PROGRESS NOTES
Pt called and asked if he was going to have surgery today.  A text was sent to the MD to clarify this.

## 2019-11-01 NOTE — PROGRESS NOTES
"Dear Dr. Cullen:    We saw Mr. Asher in Pituitary Clinic today for evaluation of a pituitary tumor and acute symptoms. He is here today with his wife. In summary, he is a 39 year old right handed man who awoke with sudden onset of headache one week ago, There was associated nausea but no vomiting. The headache was mostly in the temples and there was pressure behind the right eye. About two days later, he developed double vision which he describes as \"freezing\" of the eyes. He was seen in an ED when he developed the severe headache and imaging was performed. It is not clear if he had a cranial nerve palsy at the time of the ED visit. He was then seen by neurosurgery at [facility] and follow up studies and outpatient follow up was apparently recommended. He has been on Tylenol, ibuprofen, dexamethasone.     His headache has improved since last week. The nausea has resolved. His appetite is good. He has deliberately lost 35 lbs over the past year on a \"Keto\" diet. He has been increasingly fatigued over the past month. His libido has been low for several months. He still is able to have erections. He describes feeling cold more often recently.     He denies peripheral vision loss. He has some visual blurring on the right.     PMH: Right knee MCL/ACL repair, tonsillectomy, wisdom tooth extraction, bilateral LASIK    FH: No known intracranial tumors    Social: He lives in [place], [place]. He is  and has three children. 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    On exam, his general appearance is notable for a right eye patch. He is thin. He has a receding hair line. He appears comfortable and has no photophobia. He has a right sixth nerve palsy. No ptosis or proptosis. Pupils are equal and reactive. No APD. Face is symmetric. Speech and language are normal. He has normal strength and coordination throughout. Visual fields are full on confrontation.    We reviewed his MRI with him and with wife. He " has a hypoenhancing mass in the sella with suprasellar and right parasellar extension. There is some compression of the right parasellar space. There is contact with but not marked compression of the optic apparatus. The floor of the sella is expanded. The tumor is less than 3 cm in size. The gland appears to be displaced superiorly, posteriorly, and to the left.     His testosterone is low at 70. Free T4 is mildly low at 0.6. Prolactin 4.5; IGF is mildly elevated at 301     Impression/Plan: This patient has a symptomatic pituitary adenoma. 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 He has mild hypothyroidism and hypogonadism. Although he has mildly elevated IGF-1, he does not have acromegaly. We recommend urgent resection of the tumor through an endoscopic, transnasal approach. We discussed favorable visual prognosis since his onset was only a few days ago and the other cranial nerves are not involved. His double vision may take several weeks or months to improve. His best chance at recovery is early surgery.     We discussed the risks of surgery including death, carotid injury, stroke, hemorrhage, failure to improve, CSF leak, panhypopituitarism, need for reoperation, and he agrees to proceed. We will start him on a low dose of levothyroxine.    We will aim for surgery tomorrow. We will keep you informed of his progress. Please do not hesitate to contact us with questions.    KATI MORRELL MD

## 2019-11-01 NOTE — LETTER
Transition Communication Hand-off for Care Transitions to Next Level of Care Provider    Name: Luisito Asher  : 1980  MRN #: [id number]  Primary Care Provider: Antony Cullen     Primary Clinic: [facility] [address]  [facility] [address]     Reason for Hospitalization:  Surgery  Pituitary tumor  Admit Date/Time: 2019  2:40 PM  Discharge Date: 19  Payor Source: Payor: BCBS / Plan: [place] [place] / Product Type: Indemnity /     Discharge Plan: home with clinic follow up and outpatient vision therapy    Discharge Needs Assessment:  Needs      Most Recent Value   Equipment Currently Used at Home  none        Follow-up plan:    Future Appointments   Date Time Provider Department Center   2019  1:40 PM Antony Diana MD [facility] [place]       Any outstanding tests or procedures:        Referrals     Future Labs/Procedures    Occupational Therapy Referral     Process Instructions:    Work Related Injury: Functional Capacity and Work Conditioning are only offered at [facility] and [facility] (service can be provided by PT or OT).    *This therapy referral will be filtered to a centralized scheduling office at [facility] and the patient will receive a call to schedule an appointment at a [place] location most convenient for them. *    Comments:    If you have not heard from the scheduling office within 2 business days, please call [phone] for all locations, with the exception of [place], please call [phone] and [place], please call [phone].    Please be aware that coverage of these services is subject to the terms and limitations of your health insurance plan.  Call member services at your health plan with any benefit or coverage questions.              Key Recommendations:  See avs    Afia Cooper RN    AVS/Discharge Summary is the source of truth; this is a helpful guide for improved communication of patient story

## 2019-11-02 ENCOUNTER — APPOINTMENT (OUTPATIENT)
Dept: OCCUPATIONAL THERAPY | Facility: CLINIC | Age: 39
End: 2019-11-02
Attending: NEUROLOGICAL SURGERY
Payer: COMMERCIAL

## 2019-11-02 LAB
ANION GAP SERPL CALCULATED.3IONS-SCNC: 4 MMOL/L (ref 3–14)
BASOPHILS # BLD AUTO: 0 10E9/L (ref 0–0.2)
BASOPHILS NFR BLD AUTO: 0.2 %
BUN SERPL-MCNC: 17 MG/DL (ref 7–30)
CALCIUM SERPL-MCNC: 8.5 MG/DL (ref 8.5–10.1)
CHLORIDE SERPL-SCNC: 100 MMOL/L (ref 94–109)
CO2 SERPL-SCNC: 29 MMOL/L (ref 20–32)
CREAT SERPL-MCNC: 1.12 MG/DL (ref 0.66–1.25)
DIFFERENTIAL METHOD BLD: ABNORMAL
EOSINOPHIL # BLD AUTO: 0.1 10E9/L (ref 0–0.7)
EOSINOPHIL NFR BLD AUTO: 0.4 %
ERYTHROCYTE [DISTWIDTH] IN BLOOD BY AUTOMATED COUNT: 12.9 % (ref 10–15)
GFR SERPL CREATININE-BSD FRML MDRD: 82 ML/MIN/{1.73_M2}
GLUCOSE SERPL-MCNC: 112 MG/DL (ref 70–99)
HCT VFR BLD AUTO: 39.1 % (ref 40–53)
HGB BLD-MCNC: 12.7 G/DL (ref 13.3–17.7)
IMM GRANULOCYTES # BLD: 0.2 10E9/L (ref 0–0.4)
IMM GRANULOCYTES NFR BLD: 1.4 %
LYMPHOCYTES # BLD AUTO: 2 10E9/L (ref 0.8–5.3)
LYMPHOCYTES NFR BLD AUTO: 15.3 %
MAGNESIUM SERPL-MCNC: 2.2 MG/DL (ref 1.6–2.3)
MCH RBC QN AUTO: 29.2 PG (ref 26.5–33)
MCHC RBC AUTO-ENTMCNC: 32.5 G/DL (ref 31.5–36.5)
MCV RBC AUTO: 90 FL (ref 78–100)
MONOCYTES # BLD AUTO: 1.6 10E9/L (ref 0–1.3)
MONOCYTES NFR BLD AUTO: 12.5 %
NEUTROPHILS # BLD AUTO: 9 10E9/L (ref 1.6–8.3)
NEUTROPHILS NFR BLD AUTO: 70.2 %
NRBC # BLD AUTO: 0 10*3/UL
NRBC BLD AUTO-RTO: 0 /100
PHOSPHATE SERPL-MCNC: 3.8 MG/DL (ref 2.5–4.5)
PLATELET # BLD AUTO: 267 10E9/L (ref 150–450)
POTASSIUM SERPL-SCNC: 4.2 MMOL/L (ref 3.4–5.3)
RBC # BLD AUTO: 4.35 10E12/L (ref 4.4–5.9)
SODIUM SERPL-SCNC: 133 MMOL/L (ref 133–144)
SODIUM SERPL-SCNC: 137 MMOL/L (ref 133–144)
SP GR UR STRIP: 1 (ref 1–1.03)
WBC # BLD AUTO: 12.8 10E9/L (ref 4–11)

## 2019-11-02 PROCEDURE — 85025 COMPLETE CBC W/AUTO DIFF WBC: CPT | Performed by: STUDENT IN AN ORGANIZED HEALTH CARE EDUCATION/TRAINING PROGRAM

## 2019-11-02 PROCEDURE — 97535 SELF CARE MNGMENT TRAINING: CPT | Mod: GO

## 2019-11-02 PROCEDURE — 36415 COLL VENOUS BLD VENIPUNCTURE: CPT | Performed by: STUDENT IN AN ORGANIZED HEALTH CARE EDUCATION/TRAINING PROGRAM

## 2019-11-02 PROCEDURE — 80048 BASIC METABOLIC PNL TOTAL CA: CPT | Performed by: STUDENT IN AN ORGANIZED HEALTH CARE EDUCATION/TRAINING PROGRAM

## 2019-11-02 PROCEDURE — 84295 ASSAY OF SERUM SODIUM: CPT | Performed by: STUDENT IN AN ORGANIZED HEALTH CARE EDUCATION/TRAINING PROGRAM

## 2019-11-02 PROCEDURE — 25000128 H RX IP 250 OP 636: Performed by: STUDENT IN AN ORGANIZED HEALTH CARE EDUCATION/TRAINING PROGRAM

## 2019-11-02 PROCEDURE — 81003 URINALYSIS AUTO W/O SCOPE: CPT | Performed by: STUDENT IN AN ORGANIZED HEALTH CARE EDUCATION/TRAINING PROGRAM

## 2019-11-02 PROCEDURE — 25800030 ZZH RX IP 258 OP 636: Performed by: STUDENT IN AN ORGANIZED HEALTH CARE EDUCATION/TRAINING PROGRAM

## 2019-11-02 PROCEDURE — 25000131 ZZH RX MED GY IP 250 OP 636 PS 637: Performed by: STUDENT IN AN ORGANIZED HEALTH CARE EDUCATION/TRAINING PROGRAM

## 2019-11-02 PROCEDURE — 84100 ASSAY OF PHOSPHORUS: CPT | Performed by: STUDENT IN AN ORGANIZED HEALTH CARE EDUCATION/TRAINING PROGRAM

## 2019-11-02 PROCEDURE — 25000132 ZZH RX MED GY IP 250 OP 250 PS 637: Performed by: STUDENT IN AN ORGANIZED HEALTH CARE EDUCATION/TRAINING PROGRAM

## 2019-11-02 PROCEDURE — 12000001 ZZH R&B MED SURG/OB UMMC

## 2019-11-02 PROCEDURE — 83735 ASSAY OF MAGNESIUM: CPT | Performed by: STUDENT IN AN ORGANIZED HEALTH CARE EDUCATION/TRAINING PROGRAM

## 2019-11-02 PROCEDURE — 97165 OT EVAL LOW COMPLEX 30 MIN: CPT | Mod: GO

## 2019-11-02 RX ORDER — HYDROMORPHONE HYDROCHLORIDE 1 MG/ML
.3-.5 INJECTION, SOLUTION INTRAMUSCULAR; INTRAVENOUS; SUBCUTANEOUS
Status: DISCONTINUED | OUTPATIENT
Start: 2019-11-02 | End: 2019-11-02

## 2019-11-02 RX ORDER — OXYCODONE HYDROCHLORIDE 5 MG/1
5-10 TABLET ORAL EVERY 4 HOURS PRN
Status: DISCONTINUED | OUTPATIENT
Start: 2019-11-02 | End: 2019-11-04 | Stop reason: HOSPADM

## 2019-11-02 RX ORDER — NALOXONE HYDROCHLORIDE 0.4 MG/ML
.1-.4 INJECTION, SOLUTION INTRAMUSCULAR; INTRAVENOUS; SUBCUTANEOUS
Status: ACTIVE | OUTPATIENT
Start: 2019-11-02 | End: 2019-11-03

## 2019-11-02 RX ADMIN — ACETAMINOPHEN 325 MG: 325 TABLET, FILM COATED ORAL at 00:25

## 2019-11-02 RX ADMIN — SENNOSIDES AND DOCUSATE SODIUM 2 TABLET: 8.6; 5 TABLET ORAL at 20:50

## 2019-11-02 RX ADMIN — HYDROMORPHONE HYDROCHLORIDE 0.5 MG: 1 INJECTION, SOLUTION INTRAMUSCULAR; INTRAVENOUS; SUBCUTANEOUS at 09:13

## 2019-11-02 RX ADMIN — FENTANYL CITRATE 25 MCG: 50 INJECTION, SOLUTION INTRAMUSCULAR; INTRAVENOUS at 00:12

## 2019-11-02 RX ADMIN — SENNOSIDES AND DOCUSATE SODIUM 1 TABLET: 8.6; 5 TABLET ORAL at 01:59

## 2019-11-02 RX ADMIN — HYDROMORPHONE HYDROCHLORIDE 0.3 MG: 1 INJECTION, SOLUTION INTRAMUSCULAR; INTRAVENOUS; SUBCUTANEOUS at 02:50

## 2019-11-02 RX ADMIN — DEXAMETHASONE 2 MG: 2 TABLET ORAL at 20:50

## 2019-11-02 RX ADMIN — DEXAMETHASONE 2 MG: 2 TABLET ORAL at 02:50

## 2019-11-02 RX ADMIN — SENNOSIDES AND DOCUSATE SODIUM 1 TABLET: 8.6; 5 TABLET ORAL at 08:08

## 2019-11-02 RX ADMIN — SODIUM CHLORIDE, POTASSIUM CHLORIDE, SODIUM LACTATE AND CALCIUM CHLORIDE: 600; 310; 30; 20 INJECTION, SOLUTION INTRAVENOUS at 23:34

## 2019-11-02 RX ADMIN — PANTOPRAZOLE SODIUM 40 MG: 40 TABLET, DELAYED RELEASE ORAL at 08:07

## 2019-11-02 RX ADMIN — LEVOTHYROXINE SODIUM 50 MCG: 50 TABLET ORAL at 08:07

## 2019-11-02 RX ADMIN — SODIUM CHLORIDE: 9 INJECTION, SOLUTION INTRAVENOUS at 02:37

## 2019-11-02 RX ADMIN — ACETAMINOPHEN 975 MG: 325 TABLET, FILM COATED ORAL at 16:02

## 2019-11-02 RX ADMIN — ACETAMINOPHEN 975 MG: 325 TABLET, FILM COATED ORAL at 08:07

## 2019-11-02 RX ADMIN — ACETAMINOPHEN 975 MG: 325 TABLET, FILM COATED ORAL at 01:58

## 2019-11-02 RX ADMIN — DEXAMETHASONE 2 MG: 2 TABLET ORAL at 15:22

## 2019-11-02 RX ADMIN — ACETAMINOPHEN 975 MG: 325 TABLET, FILM COATED ORAL at 23:32

## 2019-11-02 RX ADMIN — DEXAMETHASONE 2 MG: 2 TABLET ORAL at 08:07

## 2019-11-02 RX ADMIN — HYDROMORPHONE HYDROCHLORIDE 0.5 MG: 1 INJECTION, SOLUTION INTRAMUSCULAR; INTRAVENOUS; SUBCUTANEOUS at 06:10

## 2019-11-02 RX ADMIN — ONDANSETRON 4 MG: 2 INJECTION INTRAMUSCULAR; INTRAVENOUS at 03:19

## 2019-11-02 ASSESSMENT — VISUAL ACUITY
OU: BASELINE;DOUBLE VISION/DIPLOPIA

## 2019-11-02 ASSESSMENT — ACTIVITIES OF DAILY LIVING (ADL)
TOILETING: 0-->INDEPENDENT
AMBULATION: 0-->INDEPENDENT
ADLS_ACUITY_SCORE: 15
ADLS_ACUITY_SCORE: 18
FALL_HISTORY_WITHIN_LAST_SIX_MONTHS: NO
ADLS_ACUITY_SCORE: 18
TRANSFERRING: 0-->INDEPENDENT
ADLS_ACUITY_SCORE: 11
DRESS: 0-->INDEPENDENT
SWALLOWING: 0-->SWALLOWS FOODS/LIQUIDS WITHOUT DIFFICULTY
BATHING: 0-->INDEPENDENT
RETIRED_COMMUNICATION: 0-->UNDERSTANDS/COMMUNICATES WITHOUT DIFFICULTY
ADLS_ACUITY_SCORE: 13
COGNITION: 0 - NO COGNITION ISSUES REPORTED
RETIRED_EATING: 0-->INDEPENDENT

## 2019-11-02 ASSESSMENT — MIFFLIN-ST. JEOR: SCORE: 1760.8

## 2019-11-02 NOTE — PLAN OF CARE
Pt urine output started to increase around 1530. Dr. Courtney of [facility] notified; urine sample sent for spec grav- resulted wnl. Order for serial Na+ draws starting at 2200. Curtis in place; told pt would remove this evening. Up independently. Pain controlled with scheduled tylenol. Continue with POC.

## 2019-11-02 NOTE — BRIEF OP NOTE
[facility], [place]    Brief Operative Note    Pre-operative diagnosis: * No pre-op diagnosis entered *  Post-operative diagnosis Same as pre-operative diagnosis    Procedure: Procedure(s):  Endoscopic transnasal resection of tumor  Surgeon: Surgeon(s) and Role:     * Steve Deluna MD - Primary     * Luisito Mejia MD - Resident - Assisting     * Geovany Fournier MD - Resident - Assisting     * Antony Diana MD  Anesthesia: General   Estimated blood loss: Less than 50 ml  Drains: None  Specimens:   ID Type Source Tests Collected by Time Destination   A : Pituitary Tumor Tissue Pituitary Gland SURGICAL PATHOLOGY EXAM Luisito Mejia MD 11/1/2019  9:11 PM      Findings:   see op note.  Complications: None.  Implants:   Implant Name Type Inv. Item Serial No.  Lot No. LRB No. Used   GRAFT DUREPAIR DURA MATRIX 2X2&quot; [id number] Bone/Tissue/Biologic GRAFT DUREPAIR DURA MATRIX 2X2&quot; [id number]  [employer]-NEURO [id number] N/A 1

## 2019-11-02 NOTE — OP NOTE
Procedure Date: 11/1/2019      PREOPERATIVE DIAGNOSIS:   1. Pituitary macroadenoma.        2. 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       3. Sixth nerve palsy secondary to 1.     POSTOPERATIVE DIAGNOSIS:   1. Pituitary macroadenoma.        2. 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       3. Sixth nerve palsy secondary to 1.     PROCEDURES PERFORMED:   1.  Endoscopic transnasal resection of pituitary macroadenoma.   2.  Intraoperative use of frameless stereotactic neuronavigation.      ATTENDING SURGEON:     1.  Steve Deluna MD     RESIDENT SURGEON:     1. Luisito Mejia MD     ANESTHESIA:  General.      OPERATIVE INDICATIONS:  Mr. Asher is a 39 year-old man who awoke with sudden onset of headache one week ago. He developed double vision and an MRI where a large sellar tumor was found with extension to the parasellar space. We recommended urgent resection of the tumor through an endoscopic endonasal approach. He presents for the above procedures.    DESCRIPTION OF PROCEDURE:  After informed consent was verified, Mr. Asher was brought to the operating room where he underwent successful endotracheal intubation with general anesthesia.  He was placed in the supine position and a Curtis catheter and arterial line were placed.  A Martino head escalera was applied and he was secured to the operating room table with his head in the neutral position.      Stereotactic Neuronavigation    His preoperative stereotactic scans including CT and MRI were transferred to the operating room navigation station and his head was co-registered to the neuronavigation system.  The coregistration was accurate.    Endoscopic Endonasal Resection  Our ENT colleagues, led by Dr. Samuel, began the operation by preparing, draping and performing a timeout.  Please see their operative note for further detail.  They elevated a nasoseptal mucosal flap and exposed the sella.  We returned to the OR and verified landmarks using neuronavigation.  We used the neuronavigation to determine the limits of bony resection. The edges of the carotids were identified and stereotaxis was used to check these boundaries. The sella floor was thin and dehiscent and we found the dura to be full. We opened the sellar floor using Kerrison punches and curettes. Horizontal and vertical bayoneted #11 blades were used to open the dura in cruciate fashion. We found a thin pseudocapsule and immediate extrusion of hemorrhagic sellar contents consistent with hemorrhagic pituitary macroadenoma. Ring curettes were used to dissect the pseudocapsule wall from the edges of the dural leaflets. We removed the tumor using ring curettes of different angles and sizes.  We removed the inferior aspect of the tumor first and advanced our resection towards the dorsum sellae.  We then turned our attention laterally to each side.  We identified the left and right cavernous carotid artery and removed the lateral portions of tumor. We then removed tumor superiorly.  Specimen was sent for final pathology. The tumor was relatively soft and hemorrhagic. As we removed the tumor, the arachnoid descended.  We identified the pituitary gland in the left lateral and posterior portion.  We identified the interface between the gland and tumor and resected the remainder of the tumor using suction and ring curettes.  We advanced the endoscope into the sella and inspected the resection cavity finding no gross tumor remaining. The arachnoid was seen in continuity without cerebrospinal fluid leak. There was some hemorrhagic pseuodcapsule that was adherent to the arachnoid. We decided against removing this and causing a large arachnoid defect. Once satisfied with the resection, hemostasis was obtained with Gelfoam slurry and tamponade.  The wound was irrigated and the sella was inspected again for any gross tumor fragments. The dural closure was performed with a Durepair graft placed onlay with respect to the dura and  inlay with respect to the bone edges.      Please see the operative report from Dr. Samuel regarding details of the closure. At the end of the operation, the headholder was removed and the patient was extubated. He was transported to the recovery room without incident.      All counts were correct at the end of the case x 2.      Dr. Morrell was present for the entire neurosurgical portion and immediately available for the entire operation.       KATI MORRELL MD       As dictated by AMRIT ZIEGLER MD       ATTESTATION: My signature attests that I was present for the entire neurosurgical portion and immediately available for the entire operation described above. I agree with the above findings.    KATI MORRELL MD

## 2019-11-02 NOTE — ANESTHESIA CARE TRANSFER NOTE
Patient: Liusito Asher    Procedure(s):  Endoscopic transnasal resection of tumor    Diagnosis: * No pre-op diagnosis entered *  Diagnosis Additional Information: No value filed.    Anesthesia Type:   General     Note:  Airway :Room Air  Patient transferred to:PACU  Comments: VSS. Breathing spontaneously at a regular rate with adequate tidal volumes and maintaining O2 sats on 6L facemask. Denies nausea or pain. No apparent complications from anesthesia.     Meghan Olvera MD  [facility] Anesthesia Resident  Handoff Report: Identifed the Patient, Identified the Reponsible Provider, Reviewed the pertinent medical history, Discussed the surgical course, Reviewed Intra-OP anesthesia mangement and issues during anesthesia, Set expectations for post-procedure period and Allowed opportunity for questions and acknowledgement of understanding      Vitals: (Last set prior to Anesthesia Care Transfer)    CRNA VITALS  11/1/2019 2220 - 11/1/2019 2316      11/1/2019             Pulse:  64    ART BP:  132/66    ART Mean:  95    SpO2:  100 %    Resp Rate (observed):  --                Electronically Signed By: Meghan Forbes MD  November 1, 2019  11:16 PM

## 2019-11-02 NOTE — PLAN OF CARE
6A-PT: Defer: Pt consult received and appreciated. Per discussion with OT and chart review, pt with no IP PT needs at this time. OT to follow for ADLs and activity tolerance. PT to defer. Will complete orders, please re-consult if pt has change in status.

## 2019-11-02 NOTE — PLAN OF CARE
Status: POD1 TSS  Vitals: AVSS on RA  Neuros: A&Ox4. Intact except right eye does not fully track to the right (wears eye patch during the day) and blurred vision  IV: PIV SL  Resp/trach: LS CTA on RA  Diet: Regular diet with good PO  Bowel status: BS+x4. Passing gas.   : Curtis- UOP WNL  Skin: moderate amount of serosang. Drainage- nasal dressing changed x3  Pain: Denies H/A anymore  Activity: Up SBA with gb   Social: Wife at bedside and supportive  Plan: Will continue to monitor I&Os and follow with POC.

## 2019-11-02 NOTE — ANESTHESIA PROCEDURE NOTES
Arterial Line Procedure Note  Staff:     Anesthesiologist:  Zoila Roque MD  Location: In OR After Induction  Procedure Start/Stop Times:     patient identified, IV checked, site marked, risks and benefits discussed, informed consent, monitors and equipment checked, pre-op evaluation and at physician/surgeon's request      Correct Patient: Yes      Correct Position: Yes      Correct Site: Yes      Correct Procedure: Yes      Correct Laterality:  Yes    Site Marked:  Yes  Line Placement:     Procedure:  Arterial Line    Insertion Site:  Radial    Insertion laterality:  Left    Skin Prep: Chloraprep      Patient Prep: patient draped, mask, sterile gloves, hat and hand hygiene      Local skin infiltration:  None    Ultrasound Guided?: No      Catheter size:  20 gauge, Quick cath    Cath secured with: suture      Dressing:  Tegaderm    Complications:  None obvious    Arterial waveform: Yes      IBP within 10% of NIBP: Yes

## 2019-11-02 NOTE — PROGRESS NOTES
Otolaryngology Progress Note  November 2, 2019    S: No acute events overnight.     O: /75 (BP Location: Left arm)   Pulse 51   Temp 96.5  F (35.8  C) (Axillary)   Resp 16   Ht 1.829 m (6')   Wt 83.4 kg (183 lb 13.8 oz)   SpO2 100%   BMI 24.94 kg/m     General: Alert and oriented x 3, No acute distress   HEENT: PERRL. Stable extraocular exam, some oozing with small collection on a nasal sling    Pulmonary: Breathing non-labored, no stridor, no accessory muscle use.    LABS:  ROUTINE IP LABS (Last four results)  BMP  Recent Labs   Lab 11/01/19  1635      POTASSIUM 3.9   CHLORIDE 105   MARK 9.1   CO2 27   BUN 29   CR 0.98   GLC 85     CBC  Recent Labs   Lab 11/01/19  1635   WBC 12.0*   RBC 4.87   HGB 14.2   HCT 43.0   MCV 88   MCH 29.2   MCHC 33.0   RDW 13.0        INR  Recent Labs   Lab 11/01/19  1635   INR 1.03       A/P: Luisito Asher is a 39 year old male with history of a pituitary macroadenoma causing visual changes. He underwent a transnasal resection on 11/1/2019    - Sinus precautions: sneeze/cough with mouth open, no straining, no nose blowing, no straws, no bending over forward, no heavy lifting, no incentive spirometer, no positive pressure respiratory treatments  - Good bowel regimen prevent straining  - Monitor for CSF leak  - Lentz splints will be removed in 3 weeks at follow-up appointment. No indication for antibiotics while in place.  - Order nasal saline spray on discharge   - All other cares per primary team.  Please feel free to contact ENT on-call with questions or concerns.    Melvin Jackson MD  Otolaryngology-Head & Neck Surgery    Please contact ENT with questions by dialing * [phone] and entering job [phone] when prompted.

## 2019-11-02 NOTE — PLAN OF CARE
Discharge Planner OT   Patient plan for discharge: Home with A  Current status: Pt supine inclined in bed upon arrival. Pt agreeable to therapy session. Therapist educated pt on OT role and discussed POC/Goals for therapy. Educated pt on sinus precautions and safety with functional transfers using good body mechanics. Pt required CGA and vc's for transfer sit<->standing. Pt ambulated ~300ft with CGA and vc's. Pt tolerated therapy session well. VSS.   Barriers to return to prior living situation: sinus precautions, Fatigue, pain.   Recommendations for discharge: Home with A and possible OP vision therapy.   Rationale for recommendations: current level of function.        Entered by: Wesley Monique 11/02/2019 3:11 PM

## 2019-11-02 NOTE — PROGRESS NOTES
11/02/19 0900   Quick Adds   Type of Visit Initial Occupational Therapy Evaluation   Living Environment   Lives With child(sofía), dependent  (3 kids)   Living Arrangements house   Home Accessibility stairs to enter home;stairs within home   Number of Stairs, Main Entrance 2   Stair Railings, Main Entrance railings on both sides of stairs   Number of Stairs, Within Home, Primary other (see comments)  (12)   Stair Railings, Within Home, Primary railing on right side (ascending)   Transportation Anticipated car, drives self   Living Environment Comment Pt lives in home on second level of 2 story home. Pt has a walk in shower.    Self-Care   Usual Activity Tolerance good   Current Activity Tolerance moderate   Regular Exercise Yes   Activity/Exercise Type running/jogging;strength training   Exercise Amount/Frequency 45 mins;2 times/wk   Equipment Currently Used at Home none   Functional Level   Ambulation 0-->independent   Transferring 0-->independent   Toileting 0-->independent   Bathing 0-->independent   Dressing 0-->independent   Eating 0-->independent   Communication 0-->understands/communicates without difficulty   Swallowing 0-->swallows foods/liquids without difficulty   Cognition 0 - no cognition issues reported   Fall history within last six months no   Which of the above functional risks had a recent onset or change? ambulation;transferring   General Information   Onset of Illness/Injury or Date of Surgery - Date 11/01/19   Referring Physician Rakesh Eastman MD   Patient/Family Goals Statement Pt would like    Additional Occupational Profile Info/Pertinent History of Current Problem Luisito Asher is a 39 year old male with history of a pituitary macroadenoma causing visual changes. He underwent a transnasal resection on 11/1/2019   Precautions/Limitations fall precautions;other (see comments)  (sinus precautions. )   Weight-Bearing Status - LUE   (sinus precautions.)   Weight-Bearing Status - RUE  full weight-bearing  (sinus precautions.)   Weight-Bearing Status - LLE full weight-bearing   Cognitive Status Examination   Orientation orientation to person, place and time   Level of Consciousness alert   Follows Commands (Cognition) WNL   Visual Perception   Visual Perception No deficits were identified   Visual Perception Comments double vision.    Pain Assessment   Patient Currently in Pain Yes, see Vital Sign flowsheet  (headache, temples)   Integumentary/Edema   Integumentary/Edema no deficits were identifed   Range of Motion (ROM)   ROM Comment BUE AROM WFL.    Strength   Strength Comments Pt reports mild generalize weakness.    Mobility   Bed Mobility Comments CGA and vc's.    Transfer Skills   Transfer Comments CGA and vc's.    Activities of Daily Living Analysis   Impairments Contributing to Impaired Activities of Daily Living pain;post surgical precautions;strength decreased   General Therapy Interventions   Planned Therapy Interventions ADL retraining;IADL retraining;bed mobility training;strengthening;transfer training;visual perception;home program guidelines;progressive activity/exercise   Clinical Impression   Criteria for Skilled Therapeutic Interventions Met yes, treatment indicated   OT Diagnosis Decreased independence with ADLs/functional transfer pos op.    Influenced by the following impairments Decreased functional mobility, double vision, Fatigue.    Assessment of Occupational Performance 1-3 Performance Deficits   Identified Performance Deficits Decreased indepenence with fucntional transfers/ADLs.    Clinical Decision Making (Complexity) Low complexity   Therapy Frequency Daily   Predicted Duration of Therapy Intervention (days/wks) 11/9/2019   Anticipated Discharge Disposition Home with Assist;Home with Outpatient Therapy;Other (see comments)  (potential OP vision therapy.)   Risks and Benefits of Treatment have been explained. Yes   Patient, Family & other staff in agreement with plan  "of care Yes   [facility] AM-PAC TM \"6 Clicks\"   2016, Trustees of [facility], under license to [employer].  All rights reserved.   6 Clicks Short Forms Daily Activity Inpatient Short Form   [facility]-PAC  \"6 Clicks\" Daily Activity Inpatient Short Form   1. Putting on and taking off regular lower body clothing? 3 - A Little   2. Bathing (including washing, rinsing, drying)? 3 - A Little   3. Toileting, which includes using toilet, bedpan or urinal? 1 - Total   4. Putting on and taking off regular upper body clothing? 4 - None   5. Taking care of personal grooming such as brushing teeth? 4 - None   6. Eating meals? 4 - None   Daily Activity Raw Score (Score out of 24.Lower scores equate to lower levels of function) 19   Total Evaluation Time   Total Evaluation Time (Minutes) 10     "

## 2019-11-02 NOTE — PROGRESS NOTES
S: Bloody nasal discharge overnight. Ongoing right cranial nerve VI palsy.    O:  Exam:  General: Awake;  Alert, In No Acute Distress  Pulm: Breathing Comfortably on room air  Mental status: Oriented x 3  Cranial Nerves: Right cranial nerve VI palsy; otherwise Cranial Nerves II-XII Intact Bilaterally  Strength:      Del Tr Bi WE WF Gr  R 5 5 5 5 5 5  L 5 5 5 5 5 5     HF KE KF DF PF   R 5 5 5 5 5   L 5 5 5 5 5     Pronator Drift: Absent  Sensory: Intact to Light Touch  Reflexes: No Hyperreflexia, Marroquin s or Clonus Present; Toes Down-Going Bilaterally  INCISION: Bloody drainage from nose    Assessment:       Plan:     Neuro:   Neuro Checks: per floor postoperative protocol  Cerebral Edema: Decadron 2 mg q6h  Monitor for CSF leak  Nasal precautions    Cardiovascular: blood pressure goals: SBP < 140     Pulmonary: Incentive spirometry     Gastrointestinal: advance diet     Renal:   Discontinue marquez  Monitor strict I&O  DI watch    Heme:No issues; Maintain INR < 1.4; Platelet > 100K; Fibrinogen > 200, Hemoglobin > 8    Endocrine: No issues    Infectious: Monitor for fever    PT/OT: pending    DVT prophylaxis: Sequential compression devices    Ulcer prophylaxis: none indicated    DISPO:  6A    Barriers: DI watch, evaluation by therapies, ambulating, BM/flatus, voiding without a Marquez, tolerating PO diet, pain controlled on PO medications      Rakesh Eastman M.D.  Neurosurgery Resident, PGY-2    Please contact neurosurgery resident on call with questions.    Dial * * [phone], enter [phone] when prompted.

## 2019-11-02 NOTE — PLAN OF CARE
POD 0 TSS for pituitary tumor resection. Nasal sling in place, small amount of bloody drainage. Denies salty metallic taste. I&O WNL thus far. Curtis in until POD 1. Neuros intact ex throbbing HA, diplopia, right eye track lags to the right field. VSS on RA. PIV  ml/hr. Up SBA. Clear liquid diet to advance as tolerate. Denies nausea. Dilaudid IV given x2, ok'd per MD Eastman. Continue to monitor.

## 2019-11-02 NOTE — OP NOTE
Procedure Date: 11/01/2019      PREOPERATIVE DIAGNOSES:   1.  Pituitary macroadenoma.   2.  Right sixth cranial nerve palsy.   3.  Pituitary apoplexia.      POSTOPERATIVE DIAGNOSIS:     1.  Pituitary macroadenoma.   2.  Right sixth cranial nerve palsy.   3.  Pituitary apoplexia.      PROCEDURE:  Stealth-guided transnasal endoscopic excision of pituitary macroadenoma.      SURGEONS:  Antony Diana MD and Steve Deluna MD      ANESTHESIA:  General with orotracheal tube.      ESTIMATED BLOOD LOSS: About 50 mL.      COMPLICATIONS:  None.      CONDITION:  The patient was extubated and transferred to the recovery room in stable condition.      INDICATIONS:  Mr. Asher is a 39-year-old gentleman who a few weeks ago presented to an outside institution with intense headache.  The patient was at that time diagnosed with a pituitary macroadenoma and he was discharged to home.  He then developed right-sided sixth cranial nerve palsy.  He was evaluated in the Neurosurgery Department here at the [facility], and he was advised to undergo surgery for the decompression and removal of a pituitary macroadenoma and for pituitary apoplexy.      FINDINGS:  Very narrow sinonasal passages.  Septum is in the midline, slightly deviated to the right.  Inferior middle turbinates bilaterally are within normal limits.  He has a very narrow sphenoid sinus.  The patient has very thick bone in the sphenoid sinus that did require a lot of drilling.      DESCRIPTION OF PROCEDURE:  Risks and benefits of the procedure were explained to the patient.  Informed consent was obtained.  The patient was taken to the operating room today 11/01/2019.  The  patient was placed in the supine position.  General anesthesia was induced and orotracheal tube was placed with no difficulties.  The operating table was turned 180 degrees and the patient was placed in the [place] head escalera by the Neurosurgery team.  The Stealth-guided  system was set up and found to be very accurate.  Following this, the patient was prepped and draped in the sterile fashion for a transnasal endoscopic excision of pituitary microadenoma.  Following this, a time-out was performed by myself and the operating room staff.  Next, I used a 0-degree nasal endoscope to gain access into the nasal cavity.  We initiated our procedure with injection of the sinonasal passages with 1:100,000 units of epinephrine.  I elevated a left-sided nasal septal flap.  This nasoseptal flap was pedicle to the posterior septal artery.  The nasoseptal flap was placed back into the nasopharynx.  Following this, I created a left-sided flap. This was initially rotated anteriorly.  Next, I resected the superior turbinates bilaterally and I used a 4 mm sammie drill to drill the sphenoid sinus rostrum.  This was very thick.  We gained access into the sphenoid sinus.  The sphenoid sinus was enlarged maximally, superiorly, inferiorly, and laterally.  I removed all the septations within the sphenoid sinus.  I exposed the sellar as well as the planum as well as the clival recess.  The right posterior septal flap was finally resected for free grafting was passed to the back table.  Next, I drilled the anterior sellar wall.  I exposed the dura.  After exposing the dura, we called Dr. Deluna into the room and he will be dictating the tumor removal portion of this procedure.  Once the procedure was finished, the closure was as following.  We did not have any CSF leak.  He did apply a piece of Durepair as an under layer fashion and then we applied a free mucosal graft that was obtained from the right posterior septum.  This was placed as an over layer fashion, and graft was secured with pieces of Surgicel.  Then, we applied DuraSeal and then applied pieces of Gelfoam.  Next, I recovered the nasoseptal flap from the nasopharynx.  This nasoseptal flap was placed back into its original position, secured with  4-0 chromic.  I applied bilateral Lentz splints.  These were secured with 2-0 nylon.  At this time, I advanced a nasogastric tube into the patient's stomach and the contents were suctioned.  At this time, the procedure was ended.  The patient was awakened and taken to the recovery room in stable condition.  I was present for the entire length of the case.         KAREEM ELLISON MD             D: 2019   T: 2019   MT: NICHOLAS      Name:     NABILAAMRTI WYNN   MRN:      [id number]        Account:        [id number]   :      1980           Procedure Date: 2019      Document: [id number]

## 2019-11-02 NOTE — ANESTHESIA POSTPROCEDURE EVALUATION
Anesthesia POST Procedure Evaluation    Patient: Luisito Asher   MRN:     [id number] Gender:   male   Age:    39 year old :      1980        Preoperative Diagnosis: * No pre-op diagnosis entered *   Procedure(s):  Endoscopic transnasal resection of tumor   Postop Comments: No value filed.       Anesthesia Type:  Not documented  General    Reportable Event: NO     PAIN: Uncomplicated   Sign Out status: Comfortable, Well controlled pain     PONV: No PONV   Sign Out status:  No Nausea or Vomiting     Neuro/Psych: Uneventful perioperative course   Sign Out Status: Preoperative baseline; Age appropriate mentation     Airway/Resp.: Uneventful perioperative course   Sign Out Status: Non labored breathing, age appropriate RR; Resp. Status within EXPECTED Parameters     CV: Uneventful perioperative course   Sign Out status: Appropriate BP and perfusion indices; Appropriate HR/Rhythm     Disposition:   Sign Out in:  PACU  Disposition:  Floor  Recovery Course: Uneventful  Follow-Up: Not required           Last Anesthesia Record Vitals:  CRNA VITALS  2019 2220 - 2019 2320      2019             Pulse:  64    ART BP:  132/66    ART Mean:  95    SpO2:  100 %    Resp Rate (observed):  --          Last PACU Vitals:  Vitals Value Taken Time   /93 2019 11:40 PM   Temp 35.9  C (96.7  F) 2019 11:00 PM   Pulse 55 2019 11:40 PM   Resp 16 2019 11:15 PM   SpO2 99 % 2019 11:41 PM   Temp src     NIBP     Pulse     SpO2     Resp     Temp     Ht Rate     Temp 2     Vitals shown include unvalidated device data.      Electronically Signed By: Hans Davis MD, 2019, 11:42 PM

## 2019-11-03 LAB
ANION GAP SERPL CALCULATED.3IONS-SCNC: 4 MMOL/L (ref 3–14)
BASOPHILS # BLD AUTO: 0 10E9/L (ref 0–0.2)
BASOPHILS NFR BLD AUTO: 0.1 %
BUN SERPL-MCNC: 16 MG/DL (ref 7–30)
CALCIUM SERPL-MCNC: 8.9 MG/DL (ref 8.5–10.1)
CHLORIDE SERPL-SCNC: 104 MMOL/L (ref 94–109)
CO2 SERPL-SCNC: 30 MMOL/L (ref 20–32)
CREAT SERPL-MCNC: 1.1 MG/DL (ref 0.66–1.25)
DIFFERENTIAL METHOD BLD: ABNORMAL
EOSINOPHIL # BLD AUTO: 0 10E9/L (ref 0–0.7)
EOSINOPHIL NFR BLD AUTO: 0.2 %
ERYTHROCYTE [DISTWIDTH] IN BLOOD BY AUTOMATED COUNT: 13 % (ref 10–15)
GFR SERPL CREATININE-BSD FRML MDRD: 84 ML/MIN/{1.73_M2}
GLUCOSE SERPL-MCNC: 93 MG/DL (ref 70–99)
HCT VFR BLD AUTO: 40 % (ref 40–53)
HGB BLD-MCNC: 13 G/DL (ref 13.3–17.7)
IMM GRANULOCYTES # BLD: 0.1 10E9/L (ref 0–0.4)
IMM GRANULOCYTES NFR BLD: 0.9 %
LYMPHOCYTES # BLD AUTO: 1.7 10E9/L (ref 0.8–5.3)
LYMPHOCYTES NFR BLD AUTO: 17 %
MAGNESIUM SERPL-MCNC: 2.5 MG/DL (ref 1.6–2.3)
MCH RBC QN AUTO: 29.4 PG (ref 26.5–33)
MCHC RBC AUTO-ENTMCNC: 32.5 G/DL (ref 31.5–36.5)
MCV RBC AUTO: 91 FL (ref 78–100)
MONOCYTES # BLD AUTO: 1 10E9/L (ref 0–1.3)
MONOCYTES NFR BLD AUTO: 9.8 %
NEUTROPHILS # BLD AUTO: 7.2 10E9/L (ref 1.6–8.3)
NEUTROPHILS NFR BLD AUTO: 72 %
NRBC # BLD AUTO: 0 10*3/UL
NRBC BLD AUTO-RTO: 0 /100
PHOSPHATE SERPL-MCNC: 3.6 MG/DL (ref 2.5–4.5)
PLATELET # BLD AUTO: 260 10E9/L (ref 150–450)
POTASSIUM SERPL-SCNC: 4 MMOL/L (ref 3.4–5.3)
RBC # BLD AUTO: 4.42 10E12/L (ref 4.4–5.9)
SODIUM SERPL-SCNC: 137 MMOL/L (ref 133–144)
SODIUM SERPL-SCNC: 138 MMOL/L (ref 133–144)
SODIUM SERPL-SCNC: 138 MMOL/L (ref 133–144)
SP GR UR STRIP: 1 (ref 1–1.03)
SP GR UR STRIP: 1 (ref 1–1.03)
SP GR UR STRIP: 1.01 (ref 1–1.03)
WBC # BLD AUTO: 10 10E9/L (ref 4–11)

## 2019-11-03 PROCEDURE — 83735 ASSAY OF MAGNESIUM: CPT | Performed by: STUDENT IN AN ORGANIZED HEALTH CARE EDUCATION/TRAINING PROGRAM

## 2019-11-03 PROCEDURE — 25000132 ZZH RX MED GY IP 250 OP 250 PS 637: Performed by: STUDENT IN AN ORGANIZED HEALTH CARE EDUCATION/TRAINING PROGRAM

## 2019-11-03 PROCEDURE — 85025 COMPLETE CBC W/AUTO DIFF WBC: CPT | Performed by: STUDENT IN AN ORGANIZED HEALTH CARE EDUCATION/TRAINING PROGRAM

## 2019-11-03 PROCEDURE — 84100 ASSAY OF PHOSPHORUS: CPT | Performed by: STUDENT IN AN ORGANIZED HEALTH CARE EDUCATION/TRAINING PROGRAM

## 2019-11-03 PROCEDURE — 36415 COLL VENOUS BLD VENIPUNCTURE: CPT | Performed by: STUDENT IN AN ORGANIZED HEALTH CARE EDUCATION/TRAINING PROGRAM

## 2019-11-03 PROCEDURE — 80048 BASIC METABOLIC PNL TOTAL CA: CPT | Performed by: STUDENT IN AN ORGANIZED HEALTH CARE EDUCATION/TRAINING PROGRAM

## 2019-11-03 PROCEDURE — 25800030 ZZH RX IP 258 OP 636: Performed by: STUDENT IN AN ORGANIZED HEALTH CARE EDUCATION/TRAINING PROGRAM

## 2019-11-03 PROCEDURE — 12000001 ZZH R&B MED SURG/OB UMMC

## 2019-11-03 PROCEDURE — 81003 URINALYSIS AUTO W/O SCOPE: CPT | Performed by: STUDENT IN AN ORGANIZED HEALTH CARE EDUCATION/TRAINING PROGRAM

## 2019-11-03 PROCEDURE — 84295 ASSAY OF SERUM SODIUM: CPT | Performed by: STUDENT IN AN ORGANIZED HEALTH CARE EDUCATION/TRAINING PROGRAM

## 2019-11-03 PROCEDURE — 25000131 ZZH RX MED GY IP 250 OP 636 PS 637: Performed by: STUDENT IN AN ORGANIZED HEALTH CARE EDUCATION/TRAINING PROGRAM

## 2019-11-03 RX ADMIN — ACETAMINOPHEN 975 MG: 325 TABLET, FILM COATED ORAL at 14:34

## 2019-11-03 RX ADMIN — DEXAMETHASONE 2 MG: 2 TABLET ORAL at 20:42

## 2019-11-03 RX ADMIN — SODIUM CHLORIDE, POTASSIUM CHLORIDE, SODIUM LACTATE AND CALCIUM CHLORIDE: 600; 310; 30; 20 INJECTION, SOLUTION INTRAVENOUS at 09:06

## 2019-11-03 RX ADMIN — DEXAMETHASONE 2 MG: 2 TABLET ORAL at 01:58

## 2019-11-03 RX ADMIN — ACETAMINOPHEN 975 MG: 325 TABLET, FILM COATED ORAL at 23:59

## 2019-11-03 RX ADMIN — POTASSIUM CHLORIDE 20 MEQ: 750 TABLET, EXTENDED RELEASE ORAL at 09:53

## 2019-11-03 RX ADMIN — OXYCODONE HYDROCHLORIDE 5 MG: 5 TABLET ORAL at 21:52

## 2019-11-03 RX ADMIN — PANTOPRAZOLE SODIUM 40 MG: 40 TABLET, DELAYED RELEASE ORAL at 07:35

## 2019-11-03 RX ADMIN — SENNOSIDES AND DOCUSATE SODIUM 2 TABLET: 8.6; 5 TABLET ORAL at 20:42

## 2019-11-03 RX ADMIN — DEXAMETHASONE 2 MG: 2 TABLET ORAL at 07:35

## 2019-11-03 RX ADMIN — SODIUM CHLORIDE, POTASSIUM CHLORIDE, SODIUM LACTATE AND CALCIUM CHLORIDE: 600; 310; 30; 20 INJECTION, SOLUTION INTRAVENOUS at 18:39

## 2019-11-03 RX ADMIN — ACETAMINOPHEN 975 MG: 325 TABLET, FILM COATED ORAL at 07:34

## 2019-11-03 RX ADMIN — OXYCODONE HYDROCHLORIDE 5 MG: 5 TABLET ORAL at 15:31

## 2019-11-03 RX ADMIN — LEVOTHYROXINE SODIUM 50 MCG: 50 TABLET ORAL at 07:35

## 2019-11-03 RX ADMIN — SENNOSIDES AND DOCUSATE SODIUM 2 TABLET: 8.6; 5 TABLET ORAL at 07:34

## 2019-11-03 RX ADMIN — DEXAMETHASONE 2 MG: 2 TABLET ORAL at 14:34

## 2019-11-03 ASSESSMENT — ACTIVITIES OF DAILY LIVING (ADL)
ADLS_ACUITY_SCORE: 11

## 2019-11-03 ASSESSMENT — PAIN DESCRIPTION - DESCRIPTORS: DESCRIPTORS: HEADACHE

## 2019-11-03 ASSESSMENT — VISUAL ACUITY: OU: BASELINE;DOUBLE VISION/DIPLOPIA

## 2019-11-03 NOTE — PLAN OF CARE
Status: POD2 TSS  Vitals: AVSS on RA  Neuros: A&Ox4. Intact except right eye does not fully track to the right (wears eye patch during the day) and blurred vision  IV: LR at 100ml/hr per  on night shift  Resp/trach: LS CTA on RA  Diet: Regular diet with good PO  Bowel status: BS+x4. Passing gas.   : VDSP in BR into urinal - urine yellow in color; Spec gravity sent d/t high urine output; Specefic gravity 1.004; Dr. Martines updated  Skin: moderate amount of serosang. Drainage- nasal dressing changed x2  Pain: H/A moderately controlled with scheduled Tylenol  Activity: Up ad marissa  Social: Wife at bedside and supportive  Plan: Will continue to monitor I&Os and follow with POC.

## 2019-11-03 NOTE — PROGRESS NOTES
Otolaryngology Progress Note  November 3, 2019    S: No acute events overnight.     O: /77 (BP Location: Left arm)   Pulse 50   Temp 96.6  F (35.9  C) (Axillary)   Resp 18   Ht 1.829 m (6')   Wt 80.8 kg (178 lb 1.4 oz)   SpO2 99%   BMI 24.15 kg/m     General: Alert and oriented x 3, No acute distress   HEENT: PERRL. Stable extraocular exam, some oozing with small collection on a nasal sling    Pulmonary: Breathing non-labored, no stridor, no accessory muscle use.    LABS:  ROUTINE IP LABS (Last four results)  BMP  Recent Labs   Lab 11/03/19  0550 11/02/19  2208 11/02/19  1415 11/01/19  1635    137 133 137   POTASSIUM 4.0  --  4.2 3.9   CHLORIDE 104  --  100 105   MARK 8.9  --  8.5 9.1   CO2 30  --  29 27   BUN 16  --  17 29   CR 1.10  --  1.12 0.98   GLC 93  --  112* 85     CBC  Recent Labs   Lab 11/03/19  0550 11/02/19  1415 11/01/19  1635   WBC 10.0 12.8* 12.0*   RBC 4.42 4.35* 4.87   HGB 13.0* 12.7* 14.2   HCT 40.0 39.1* 43.0   MCV 91 90 88   MCH 29.4 29.2 29.2   MCHC 32.5 32.5 33.0   RDW 13.0 12.9 13.0    267 254     INR  Recent Labs   Lab 11/01/19  1635   INR 1.03       A/P: Luisito Asher is a 39 year old male with history of a pituitary macroadenoma causing visual changes. He underwent a transnasal resection on 11/1/2019    - Sinus precautions: sneeze/cough with mouth open, no straining, no nose blowing, no straws, no bending over forward, no heavy lifting, no incentive spirometer, no positive pressure respiratory treatments  - Good bowel regimen prevent straining  - Monitor for CSF leak  - Lentz splints will be removed in 3 weeks at follow-up appointment. No indication for antibiotics while in place.  - Order nasal saline spray on discharge   - All other cares per primary team.  Please feel free to contact ENT on-call with questions or concerns.    Melvin Jackson MD  Otolaryngology-Head & Neck Surgery    Please contact ENT with questions by dialing * [phone] and entering job code  [phone] when prompted.

## 2019-11-03 NOTE — PLAN OF CARE
Status: POD 2 TSS.  Vitals: VSS  Neuros: A/O x4. R eye unable to track all the way, w/blurred vision. Pt wears patch on L eye. Minimal HA, denies salty/metallic taste, post nasal drip.  IV: PIV infusing LR at 100/hr  Resp/trach: RA O2 sats stable, Lungs clear  Diet: Reg diet   Bowel status: No BM overnight, BS+  : Voiding. Spec. Gravity sent x1, WNL. Maintaining strict I&O  Skin: Nasal sling in place, Minimal serosang. Drg from nares. Drsg chg x2  Pain: Mild HA managed w/sched. tylenol  Activity: Up Indep. But needs assistance with IV  Social: Wife at bedside  Plan: Continue to monitor and follow POC.

## 2019-11-03 NOTE — PROGRESS NOTES
S: high UOP, but no jacquelin DI    O:  Exam:  General: Awake;  Alert, In No Acute Distress  Pulm: Breathing Comfortably on room air  Mental status: Oriented x 3  Cranial Nerves: Right cranial nerve VI palsy; otherwise Cranial Nerves II-XII Intact Bilaterally  Strength:      Del Tr Bi WE WF Gr  R 5 5 5 5 5 5  L 5 5 5 5 5 5     HF KE KF DF PF   R 5 5 5 5 5   L 5 5 5 5 5     Pronator Drift: Absent  Sensory: Intact to Light Touch  Reflexes: No Hyperreflexia, Marroquin s or Clonus Present; Toes Down-Going Bilaterally  INCISION: Bloody drainage from nose    Assessment:   38 yo M with pituitary macroadenoma, apoplexy, and CN 6 palsy s/p endoscopic transnasal resection of pituitary macroadenoma 11/1/19.    Plan:     Neuro:   Neuro Checks: per floor postoperative protocol  Cerebral Edema: Decadron 2 mg q6h  Monitor for CSF leak  Nasal precautions    Cardiovascular: blood pressure goals: SBP < 140     Pulmonary: Incentive spirometry     Gastrointestinal: advance diet     Renal:   Monitor strict I&O  DI watch    Heme:No issues; Maintain INR < 1.4; Platelet > 100K; Fibrinogen > 200, Hemoglobin > 8    Endocrine: No issues    Infectious: Monitor for fever    PT/OT: Home with assist with possible outpatient vision therapy    DVT prophylaxis: Sequential compression devices    Ulcer prophylaxis: protonix    DISPO:  6A    Barriers: DI watch, evaluation by therapies, ambulating, BM/flatus, tolerating PO diet, pain controlled on PO medications      Cortes Courtney MD  Neurosurgery Resident PGY2    Please contact neurosurgery resident on call with questions.    Dial * * [phone], enter [phone] when prompted.

## 2019-11-04 ENCOUNTER — APPOINTMENT (OUTPATIENT)
Dept: OCCUPATIONAL THERAPY | Facility: CLINIC | Age: 39
End: 2019-11-04
Attending: NEUROLOGICAL SURGERY
Payer: COMMERCIAL

## 2019-11-04 ENCOUNTER — TELEPHONE (OUTPATIENT)
Dept: OTOLARYNGOLOGY | Facility: CLINIC | Age: 39
End: 2019-11-04

## 2019-11-04 VITALS
RESPIRATION RATE: 16 BRPM | HEIGHT: 72 IN | HEART RATE: 50 BPM | TEMPERATURE: 95.7 F | WEIGHT: 178.09 LBS | OXYGEN SATURATION: 100 % | SYSTOLIC BLOOD PRESSURE: 119 MMHG | BODY MASS INDEX: 24.12 KG/M2 | DIASTOLIC BLOOD PRESSURE: 75 MMHG

## 2019-11-04 LAB
ANION GAP SERPL CALCULATED.3IONS-SCNC: 4 MMOL/L (ref 3–14)
BASOPHILS # BLD AUTO: 0 10E9/L (ref 0–0.2)
BASOPHILS NFR BLD AUTO: 0.2 %
BUN SERPL-MCNC: 15 MG/DL (ref 7–30)
CALCIUM SERPL-MCNC: 9.1 MG/DL (ref 8.5–10.1)
CHLORIDE SERPL-SCNC: 105 MMOL/L (ref 94–109)
CO2 SERPL-SCNC: 28 MMOL/L (ref 20–32)
CREAT SERPL-MCNC: 0.92 MG/DL (ref 0.66–1.25)
DIFFERENTIAL METHOD BLD: ABNORMAL
EOSINOPHIL # BLD AUTO: 0.1 10E9/L (ref 0–0.7)
EOSINOPHIL NFR BLD AUTO: 0.6 %
ERYTHROCYTE [DISTWIDTH] IN BLOOD BY AUTOMATED COUNT: 13 % (ref 10–15)
GFR SERPL CREATININE-BSD FRML MDRD: >90 ML/MIN/{1.73_M2}
GLUCOSE SERPL-MCNC: 88 MG/DL (ref 70–99)
HCT VFR BLD AUTO: 41.7 % (ref 40–53)
HGB BLD-MCNC: 13.6 G/DL (ref 13.3–17.7)
IMM GRANULOCYTES # BLD: 0.2 10E9/L (ref 0–0.4)
IMM GRANULOCYTES NFR BLD: 1.6 %
LYMPHOCYTES # BLD AUTO: 2.6 10E9/L (ref 0.8–5.3)
LYMPHOCYTES NFR BLD AUTO: 20.1 %
MAGNESIUM SERPL-MCNC: 2.3 MG/DL (ref 1.6–2.3)
MCH RBC QN AUTO: 29.6 PG (ref 26.5–33)
MCHC RBC AUTO-ENTMCNC: 32.6 G/DL (ref 31.5–36.5)
MCV RBC AUTO: 91 FL (ref 78–100)
MONOCYTES # BLD AUTO: 0.9 10E9/L (ref 0–1.3)
MONOCYTES NFR BLD AUTO: 7.2 %
NEUTROPHILS # BLD AUTO: 8.9 10E9/L (ref 1.6–8.3)
NEUTROPHILS NFR BLD AUTO: 70.3 %
NRBC # BLD AUTO: 0 10*3/UL
NRBC BLD AUTO-RTO: 0 /100
PHOSPHATE SERPL-MCNC: 3.5 MG/DL (ref 2.5–4.5)
PLATELET # BLD AUTO: 273 10E9/L (ref 150–450)
POTASSIUM SERPL-SCNC: 4 MMOL/L (ref 3.4–5.3)
RBC # BLD AUTO: 4.6 10E12/L (ref 4.4–5.9)
SODIUM SERPL-SCNC: 137 MMOL/L (ref 133–144)
SODIUM SERPL-SCNC: 137 MMOL/L (ref 133–144)
SP GR UR STRIP: 1.01 (ref 1–1.03)
WBC # BLD AUTO: 12.7 10E9/L (ref 4–11)

## 2019-11-04 PROCEDURE — 97530 THERAPEUTIC ACTIVITIES: CPT | Mod: GO

## 2019-11-04 PROCEDURE — 85025 COMPLETE CBC W/AUTO DIFF WBC: CPT | Performed by: NEUROLOGICAL SURGERY

## 2019-11-04 PROCEDURE — 80048 BASIC METABOLIC PNL TOTAL CA: CPT | Performed by: STUDENT IN AN ORGANIZED HEALTH CARE EDUCATION/TRAINING PROGRAM

## 2019-11-04 PROCEDURE — 25000132 ZZH RX MED GY IP 250 OP 250 PS 637: Performed by: STUDENT IN AN ORGANIZED HEALTH CARE EDUCATION/TRAINING PROGRAM

## 2019-11-04 PROCEDURE — 84295 ASSAY OF SERUM SODIUM: CPT | Performed by: STUDENT IN AN ORGANIZED HEALTH CARE EDUCATION/TRAINING PROGRAM

## 2019-11-04 PROCEDURE — 36415 COLL VENOUS BLD VENIPUNCTURE: CPT | Performed by: STUDENT IN AN ORGANIZED HEALTH CARE EDUCATION/TRAINING PROGRAM

## 2019-11-04 PROCEDURE — 81003 URINALYSIS AUTO W/O SCOPE: CPT | Performed by: STUDENT IN AN ORGANIZED HEALTH CARE EDUCATION/TRAINING PROGRAM

## 2019-11-04 PROCEDURE — 25000131 ZZH RX MED GY IP 250 OP 636 PS 637: Performed by: STUDENT IN AN ORGANIZED HEALTH CARE EDUCATION/TRAINING PROGRAM

## 2019-11-04 PROCEDURE — 97535 SELF CARE MNGMENT TRAINING: CPT | Mod: GO

## 2019-11-04 PROCEDURE — 84100 ASSAY OF PHOSPHORUS: CPT | Performed by: STUDENT IN AN ORGANIZED HEALTH CARE EDUCATION/TRAINING PROGRAM

## 2019-11-04 PROCEDURE — 83735 ASSAY OF MAGNESIUM: CPT | Performed by: STUDENT IN AN ORGANIZED HEALTH CARE EDUCATION/TRAINING PROGRAM

## 2019-11-04 RX ORDER — ECHINACEA PURPUREA EXTRACT 125 MG
1 TABLET ORAL 3 TIMES DAILY
Qty: 50 ML | Refills: 0 | Status: SHIPPED | OUTPATIENT
Start: 2019-11-04

## 2019-11-04 RX ORDER — OXYCODONE HYDROCHLORIDE 5 MG/1
5-10 TABLET ORAL EVERY 6 HOURS PRN
Qty: 12 TABLET | Refills: 0 | Status: SHIPPED | OUTPATIENT
Start: 2019-11-04 | End: 2020-01-09

## 2019-11-04 RX ADMIN — LEVOTHYROXINE SODIUM 50 MCG: 50 TABLET ORAL at 08:47

## 2019-11-04 RX ADMIN — POTASSIUM CHLORIDE 20 MEQ: 750 TABLET, EXTENDED RELEASE ORAL at 10:08

## 2019-11-04 RX ADMIN — ACETAMINOPHEN 650 MG: 325 TABLET, FILM COATED ORAL at 11:40

## 2019-11-04 RX ADMIN — ACETAMINOPHEN 975 MG: 325 TABLET, FILM COATED ORAL at 07:19

## 2019-11-04 RX ADMIN — SENNOSIDES AND DOCUSATE SODIUM 2 TABLET: 8.6; 5 TABLET ORAL at 08:47

## 2019-11-04 RX ADMIN — DEXAMETHASONE 2 MG: 2 TABLET ORAL at 13:17

## 2019-11-04 RX ADMIN — DEXAMETHASONE 2 MG: 2 TABLET ORAL at 08:47

## 2019-11-04 RX ADMIN — PANTOPRAZOLE SODIUM 40 MG: 40 TABLET, DELAYED RELEASE ORAL at 07:19

## 2019-11-04 RX ADMIN — DEXAMETHASONE 2 MG: 2 TABLET ORAL at 02:14

## 2019-11-04 ASSESSMENT — VISUAL ACUITY
OU: DOUBLE VISION/DIPLOPIA

## 2019-11-04 ASSESSMENT — ACTIVITIES OF DAILY LIVING (ADL)
ADLS_ACUITY_SCORE: 11

## 2019-11-04 ASSESSMENT — PAIN DESCRIPTION - DESCRIPTORS: DESCRIPTORS: HEADACHE

## 2019-11-04 NOTE — PLAN OF CARE
Status: POD #3 TSS  Vitals: VSS on RA  Neuros: AO4, strength 5/5 throughout. Denies salty and/or metallic taste. Mild double vision. R eye with difficulty tracking. Pt wears eye patch to R eye intermittently   IV: LR infusing at 100 mL/hr   Resp/trach: LS clear   Diet: Regular diet, tolerating well. Strict I&Os  Bowel status: No BM since surgery   : Voiding in moderate amounts, spec grav sent and WNL  Skin: Mild amount of nasal serosanguinous drainage   Pain: Controlled with Tylenol and oxycodone x 1  Activity: Up in room independently   Plan: Possible discharge today. Continue to monitor and follow POC

## 2019-11-04 NOTE — PROGRESS NOTES
Neurosurgery Progress Note      S: intermittent headaches. No issues overnight.    Exam:  Awake, alert, oriented x3  Cranial nerves: right 6th nerve palsy; face symmetric; tongue midline  Strength 5/5 in upper and lower extremities bilaterally.     Assessment: Mr. Asher is a 39 year old man who presented with pituitary apoplexy, and underwent transphenoidal pituitary tumor resection on 11/1/19 by Dr. Deluna. Doing well post-op with no signs of diabetes insipidus.    Plan:     Neuro:   Monitor sodium levels    Cardiovascular:  Maintain SBP < 160 mmHg     Pulmonary:   Encourage use of Incentive Spirometer    Gastrointestinal:   Regular Diet  Bowel Regimen w/ Senna-Docusate and Miralax  Anti-Emetics PRN     Renal:   No marquez; monitor I/Os    Heme:   No issues   Maintain INR < 1.4; Platelet > 100K; Fibrinogen > 200, Hemoglobin > 8    Endocrine:   Home decadron 2mg Q6 hrs  Home levothyroxine  Monitor for diabetes insipidus    Infectious:   No issues    DVT prophylaxis: Sequential compression devices to Bilateral Lower Extremities    Ulcer prophylaxis: pantoprazole while on steroids    DISPO: 6A    Barriers: none      -----------------------------------  Mao Lee MD, MS  Neurosurgery PGY-3    I have reviewed the history above and agree with the resident's assessment and plan.  LINDSEY Deluna MD

## 2019-11-04 NOTE — PLAN OF CARE
OT 6A  Discharge Planner OT   Patient plan for discharge: Home  Current status: Independent supine<>EOB and sit<>stand. Pt completed ~1000ft of functional mobility independently. Independent with LB dressing.   Barriers to return to prior living situation: acute medical needs  Recommendations for discharge: Home with assist for IADL completion due to precautions  Rationale for recommendations: Pt is independent with ADL completion       Entered by: Sonia Gu 11/04/2019 10:54 AM     Occupational Therapy Discharge Summary    Reason for therapy discharge:    All goals and outcomes met, no further needs identified.    Progress towards therapy goal(s). See goals on Care Plan in [facility] electronic health record for goal details.  Goals met    Therapy recommendation(s):    Continue home exercise program.

## 2019-11-04 NOTE — PLAN OF CARE
Status: Pt on 6a POD 2 TSS.   Vitals: /75   Pulse 50   Temp 96.3  F (35.7  C) (Axillary)   Resp 16   Ht 1.829 m (6')   Wt 80.8 kg (178 lb 1.4 oz)   SpO2 98%   BMI 24.15 kg/m    Neuros: A&Ox4. R eye not tracking consistently. Baseline blurry vision. Uses eye patch to help. Denies salty/metallic taste.   IV: LR @ 100 ml/hr.   Resp/trach: On RA.   Diet: Reg diet, adequate PO.   Bowel status: No BM this shift.   : Voids spontaneously. Strict I/O. Spec grav 1.003 this shift. Na 137.   Skin: Nasal sling in place for serosang drainage, changed x1.   Pain: Headache controlled with tylenol and oxy x1.   Activity: Up ad marissa. Ambulated in halls this shift.   Social: Wife at bedside, supportive.   Plan: Continue with POC.

## 2019-11-04 NOTE — PLAN OF CARE
[id number]    Status: Pt on 6a POD 2 TSS.   Vitals: /75   Pulse 50   Temp 96.3  F (35.7  C) (Axillary)   Resp 16   Ht 1.829 m (6')   Wt 80.8 kg (178 lb 1.4 oz)   SpO2 98%   BMI 24.15 kg/m    Neuros: A&Ox4. R eye not tracking consistently. Baseline blurry vision. Uses eye patch to help. Denies salty/metallic taste.   IV: LR @ 100 ml/hr.   Resp/trach: On RA.   Diet: Regular diet, adequate PO.   Bowel status: No BM this shift.   : Voids spontaneously. Strict I/O.  Skin: Nasal sling in place for serosang drainage, changed x1.   Pain: Headache controlled with tylenol and oxy x1.   Activity: Up ad marissa.   Social: Wife at bedside, supportive.   Plan: Continue with POC.

## 2019-11-04 NOTE — PROGRESS NOTES
"Patient discharged home today at 1428 after waiting for his wife to arrive for a ride. Patient is stable and medically ready for discharge. Patient was sent home with all belongings and discharge medications were filled and sent with patient. Discharge paperwork was given to and discussed with patient. Patient verbalized understanding of discharge instructions and follow up appointments.       Patient given handouts: \"Patient Timeline for Pituitary Surgery\" and \"Home Monitoring After TSS\".  Patient given hat/urinal for home use. Pt verbalized understanding of follow up instructions/ monitoring.         "

## 2019-11-05 ENCOUNTER — PATIENT OUTREACH (OUTPATIENT)
Dept: CARE COORDINATION | Facility: CLINIC | Age: 39
End: 2019-11-05

## 2019-11-06 ENCOUNTER — TELEPHONE (OUTPATIENT)
Dept: NEUROSURGERY | Facility: CLINIC | Age: 39
End: 2019-11-06

## 2019-11-06 NOTE — TELEPHONE ENCOUNTER
[facility] Call Center    Phone Message    May a detailed message be left on voicemail: yes    Reason for Call: Medication Question or concern regarding medication   Prescription Clarification  Name of Medication: Dexamethasone  Prescribing Provider: Mikie   Pharmacy: [facility]   What on the order needs clarification? How long to take this medication and what the purpose of it is.    Should it be renewed?        Action Taken: Message routed to:  [facility] ([facility] Neurosurg Nurse

## 2019-11-07 ENCOUNTER — MYC MEDICAL ADVICE (OUTPATIENT)
Dept: NEUROSURGERY | Facility: CLINIC | Age: 39
End: 2019-11-07

## 2019-11-07 ENCOUNTER — PATIENT OUTREACH (OUTPATIENT)
Dept: NEUROSURGERY | Facility: CLINIC | Age: 39
End: 2019-11-07

## 2019-11-07 ENCOUNTER — HOSPITAL ENCOUNTER (OUTPATIENT)
Dept: OCCUPATIONAL THERAPY | Facility: CLINIC | Age: 39
Setting detail: THERAPIES SERIES
End: 2019-11-07
Payer: COMMERCIAL

## 2019-11-07 ENCOUNTER — TELEPHONE (OUTPATIENT)
Dept: NEUROSURGERY | Facility: CLINIC | Age: 39
End: 2019-11-07

## 2019-11-07 DIAGNOSIS — D49.7 PITUITARY TUMOR: ICD-10-CM

## 2019-11-07 PROCEDURE — 97535 SELF CARE MNGMENT TRAINING: CPT | Mod: GO | Performed by: OCCUPATIONAL THERAPIST

## 2019-11-07 PROCEDURE — 97165 OT EVAL LOW COMPLEX 30 MIN: CPT | Mod: GO | Performed by: OCCUPATIONAL THERAPIST

## 2019-11-07 ASSESSMENT — ACTIVITIES OF DAILY LIVING (ADL): PRIOR_ADL/IADL_STATUS: INDEPENDENT PRIOR TO ONSET

## 2019-11-07 NOTE — PROGRESS NOTES
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 He underwent a transnasal resection on 11/1/2019 and was discharged home on 11/4/19.  Patient previously overall healthy and with no notable medical history   Prior ADL/IADL status Independent prior to onset   Patient/family Goals Statement get back to work and driving, get rid of double vision   General information comments post-surgery, patient reports acuity more clear in R eye, though double vision still there.     Social History/Home Environment   Living Environment [place]  (lives with spouse and 3 kids - 8, 5, and 2yo)   Current Community Support Family/friend caregiver  (spouse)   Patient Role/employment History    (director of implementation - technology)   Avocational job duties: 1 on 1 with people, computer - 50% of his day; off work likely until at least the end of the month - may start from home; leisure: coaches dtr's BB, travel, golf, hunt   Social/Environment Comment shares household tasks with wife, patient does most of finances; spouse started a new job last Wednesday   Pain Assessment   Pain Reported No  (not currently)   Comments occasional H/A since surgery - taking Tylenol for H/A and took this am   Fall Risk Screen   Have you fallen 2 or more times in the past year? No   Have you fallen and had an injury  in the past year? No   Is patient a fall risk? No   Cognitive/Behavioral   Communication Intact   Cognitive Status Intact for evaluation process   Behavior Appropriate   Patient/family aware of diagnosis Yes   How well do you understand your eye condition?   (fair )   Cognitive/Behavioral Comment no changes, very appropriate during evaluation, no further screening necessary   Physical Status/Equipment   Physical Status No problems observed/reported   Visual Report   Functional Complaints Reading;Work related tasks;Homemaking;Safety in mobility   Visual Complaints Visual fatigue;Difficulty maintaining focus;Double vision;Light sensitivity  (worse looking in the distance)   Complaints comments occulomotor: smooth pursuits - able to move R eye out to the side some, but not full ROM (~50%), most other WFL; noted R eye esotropia at rest; Dominant Eye: R  (Mild light sensitivity)   Reading glasses   (None, had Lasik a few years ago)   Technology Computer  (smart phone)   Equipment comments can do ~1 hour on screen then H/A; at work: goes between laptop and desktop   Lighting and Glare   Is your lighting adequate? Yes/ at home;Yes/ at work   Are you satisfied with your sunglasses? Yes   Sunglass filter color   (polarized; dark tint)   Visual Acuity   Acuity both eyes together see MNRead; no issues, 2 years last assessed at eye clinic   MN Read   Smallest print size read .4M ambient lighting and with task light   Critical print size .5M ambient lighting and with task light   Words per minute at critical print size 214 - Normal reading speed for normal reader: 150-200 wpm out loud.; preferred print size: 1.3 or 1.0   Clinical Impression, OT Eval   Criteria for Skilled Therapeutic Interventions Met yes   Therapy  Diagnosis: Impaired ADL/IADL with deficits in Reading based ADL;Financial management;Work performance  (Functional and community mobility)   Impairment comments Decreased visual skills affecting ADL/IADL performance    Assessment of Occupational Performance 1-3 Performance Deficits   Identified Performance Deficits Decreased ability to read for ADL/IADL tasks, decreased functional and community mobility   Clinical Decision Making (Complexity) Low complexity   OT Visual Rehabilitation Evaluation Plan   Therapy Plan Occupational therapy intervention   Planned Interventions Computer modifications;Instruction in environmental adaptations for lighting;Instruction in environmental adaptations for glare;Low vision compensatory training for reading;Visual skills training for safety in mobility   Frequency / Duration up to 3 visits over 8 weeks   Risks and Benefits of Treatment have been explained. Yes   Patient, Family in agreement with plan of care Yes   GOALS   Goals Near Vision;7   Goals Addressed this Session All goals addressed   Goal 1 - Near Vision   Near Vision Goal Comment Patient to independently verbalize and/or successfully demonstrate at least 2 strategies and adapted techniques to effectively compensate for his diplopia during functional tasks (reading, scanning.) for increased independence and safety with ADL/IADLs.   Target Date 01/02/20   Goal 7   Goal Description Patient will demonstrate WFLs visual reaction time and visual scanning skills for extrapersonal space with use of compensatory strategies prn, for safe functional and community mobility tasks (navigating in new areas, crossing the street, driving, grocery shopping) by scoring WNLs on all modes of the Dynavision and Scancourse.   Target Date 01/02/20   Total Evaluation Time   OT Misha Low Complexity Minutes (01123) 22

## 2019-11-07 NOTE — TELEPHONE ENCOUNTER
Patient called again today. I told him yesterday someone would get back to him within 24 hours as per policy.    Please reach out to patient regarding medication questions. Thanks!

## 2019-11-07 NOTE — PROGRESS NOTES
Sodium lab order faxed to [facility] in [place].      [phone] fax

## 2019-11-07 NOTE — PROGRESS NOTES
Neurosurgery Discharge Follow-Up      Responsible Attending Physician:   Date of Discharge:    Discharge to:  Home    Current Status:  Pt is a 38 y/o male s/p Pituitary resection on 11/1.  Reports pre-op symptoms improved.   Operative  pain is decreasing.  Ambulating without assistance.  Pain well controlled with current meds, ample supply.  Reports incision . Pt denies clear fluid dripper from nose, ears and elevated temp.  Denies current bowel or bladder issues.      Discharge instructions and medication use were reviewed.  RN triage/on call number given:  [phone] or after hours and w/e - [phone].  Patient verbalized understanding and agreement with current plan.

## 2019-11-08 ENCOUNTER — PATIENT OUTREACH (OUTPATIENT)
Dept: NEUROSURGERY | Facility: CLINIC | Age: 39
End: 2019-11-08

## 2019-11-08 ENCOUNTER — TRANSFERRED RECORDS (OUTPATIENT)
Dept: HEALTH INFORMATION MANAGEMENT | Facility: CLINIC | Age: 39
End: 2019-11-08

## 2019-11-08 NOTE — PROGRESS NOTES
Writer, for the second time today, faxed the order for sodium to the [facility] in [place].  The pt sent an email with the fax number for the [facility] in [place].  It is the same number in which the lab order was sent yesterday.

## 2019-11-08 NOTE — PROGRESS NOTES
Writer called to verify the fax number for the lab at the [facility] in [place] and was given a different number from yesterday.  Writer refaxed the order for the sodium lab to the new fax number.  Pt aware.  Writer suggested he call the lab before he goes there. If the lab does not have the order the writer will resend it.    [phone]  [phone] fax

## 2019-11-11 ENCOUNTER — PATIENT OUTREACH (OUTPATIENT)
Dept: NEUROSURGERY | Facility: CLINIC | Age: 39
End: 2019-11-11

## 2019-11-11 ENCOUNTER — CARE COORDINATION (OUTPATIENT)
Dept: NEUROSURGERY | Facility: CLINIC | Age: 39
End: 2019-11-11

## 2019-11-11 ENCOUNTER — TELEPHONE (OUTPATIENT)
Dept: OPHTHALMOLOGY | Facility: CLINIC | Age: 39
End: 2019-11-11

## 2019-11-11 DIAGNOSIS — H53.2 DOUBLE VISION WITH BOTH EYES OPEN: Primary | ICD-10-CM

## 2019-11-11 NOTE — PROGRESS NOTES
Returned call.    Pt called and reported HAs which can be controlled with Tylenol.  Pt reported that the HA became worse when he stopped the Dexamethazone.  Writer suggested that the pt add Ibuprofen.  The HAs seem global.  Pt has trouble discerning if one side hurts more than the other.  Pt still has double vision.  No clear fluid dripping from his nose or salty taste in his mouth.  Writer will follow up with MD.

## 2019-11-11 NOTE — TELEPHONE ENCOUNTER
Called patient and informed him that he is scheduled for an appointment with Dr. Tomas on Monday 12/2 at [facility] - at the request of Eleazar Gallego RN (in[employer] message).     Provided appointment instructions; clinic directions; and the eye clinic call center number to call back if rescheduling is needed.

## 2019-11-11 NOTE — PROGRESS NOTES
Pt's wife called asking about HAs post surgery.  Pt experienced HAs post while on Dexamethasone.  Since stopping Dexamethasone the HA have become worse.  Pt stated that the HAs are behind both eyes.  Pt thinks the HAs care connected to his vision.  Currently, the pt has double vision.  The HAs become better when he uses an eye patch.  Pt did not have an appt with neuro-ophthmologist.  Writer wrote a referral and sent a note to the scheduling pool.

## 2019-11-11 NOTE — PROGRESS NOTES
[facility]. Needs signauture, per RN scanned to [facility] chart without signature. RN has original, labeled, unsigned

## 2019-11-12 ENCOUNTER — PATIENT OUTREACH (OUTPATIENT)
Dept: NEUROSURGERY | Facility: CLINIC | Age: 39
End: 2019-11-12

## 2019-11-12 LAB — COPATH REPORT: NORMAL

## 2019-11-12 NOTE — PROGRESS NOTES
Write spoke with MD regarding HAs.  Pt stated that HAs are not as intense as his HAs prior to surgery.  Pt state his HAs are better today than yesterday.  Pt has not notice if his HAs are worse if he leans forward.  Pt manages pain with Tylenol and Ibuprofen.

## 2019-11-13 ENCOUNTER — PATIENT OUTREACH (OUTPATIENT)
Dept: NEUROSURGERY | Facility: CLINIC | Age: 39
End: 2019-11-13

## 2019-11-13 ENCOUNTER — CARE COORDINATION (OUTPATIENT)
Dept: NEUROSURGERY | Facility: CLINIC | Age: 39
End: 2019-11-13

## 2019-11-13 NOTE — PROGRESS NOTES
Writer completed FMLA disability form [employer] for patient. Form required additional employer information and patient information. All highlighted in yellow. Form was copied, faxed to [employer], mailed to  t RN notified and scanned to patient chart

## 2019-11-13 NOTE — PROGRESS NOTES
Yesterday 11/13, writer downloaded the pt's FMLA paperwork that pt e-mailed.  [place] paperwork given to LPN.

## 2019-11-21 ENCOUNTER — TELEPHONE (OUTPATIENT)
Dept: OPHTHALMOLOGY | Facility: CLINIC | Age: 39
End: 2019-11-21

## 2019-11-21 ENCOUNTER — OFFICE VISIT (OUTPATIENT)
Dept: OPHTHALMOLOGY | Facility: CLINIC | Age: 39
End: 2019-11-21
Attending: OPHTHALMOLOGY
Payer: COMMERCIAL

## 2019-11-21 ENCOUNTER — OFFICE VISIT (OUTPATIENT)
Dept: OTOLARYNGOLOGY | Facility: CLINIC | Age: 39
End: 2019-11-21
Payer: COMMERCIAL

## 2019-11-21 VITALS
TEMPERATURE: 97.7 F | BODY MASS INDEX: 24.38 KG/M2 | DIASTOLIC BLOOD PRESSURE: 73 MMHG | SYSTOLIC BLOOD PRESSURE: 101 MMHG | HEIGHT: 72 IN | OXYGEN SATURATION: 100 % | WEIGHT: 180 LBS | HEART RATE: 71 BPM | RESPIRATION RATE: 15 BRPM

## 2019-11-21 DIAGNOSIS — D49.7 PITUITARY TUMOR: ICD-10-CM

## 2019-11-21 DIAGNOSIS — D49.7 PITUITARY TUMOR: Primary | ICD-10-CM

## 2019-11-21 DIAGNOSIS — H53.10 SUBJECTIVE VISUAL DISTURBANCE: Primary | ICD-10-CM

## 2019-11-21 DIAGNOSIS — H53.40 VISUAL FIELD DEFECT: ICD-10-CM

## 2019-11-21 DIAGNOSIS — H49.21 SIXTH NERVE PALSY OF RIGHT EYE: ICD-10-CM

## 2019-11-21 DIAGNOSIS — H50.05 ALTERNATING ESOTROPIA: Primary | ICD-10-CM

## 2019-11-21 PROCEDURE — V2718 FRESNELL PRISM PRESS-ON LENS: HCPCS | Mod: ZF | Performed by: OPHTHALMOLOGY

## 2019-11-21 PROCEDURE — G0463 HOSPITAL OUTPT CLINIC VISIT: HCPCS | Mod: ZF | Performed by: TECHNICIAN/TECHNOLOGIST

## 2019-11-21 PROCEDURE — 92060 SENSORIMOTOR EXAMINATION: CPT | Mod: ZF | Performed by: OPHTHALMOLOGY

## 2019-11-21 PROCEDURE — 92083 EXTENDED VISUAL FIELD XM: CPT | Mod: ZF | Performed by: OPHTHALMOLOGY

## 2019-11-21 PROCEDURE — 92133 CPTRZD OPH DX IMG PST SGM ON: CPT | Mod: ZF | Performed by: OPHTHALMOLOGY

## 2019-11-21 ASSESSMENT — EXTERNAL EXAM - LEFT EYE: OS_EXAM: NORMAL

## 2019-11-21 ASSESSMENT — TONOMETRY
OS_IOP_MMHG: 11
IOP_METHOD: ICARE
OD_IOP_MMHG: 10

## 2019-11-21 ASSESSMENT — PAIN SCALES - GENERAL: PAINLEVEL: NO PAIN (1)

## 2019-11-21 ASSESSMENT — CONF VISUAL FIELD
OD_NORMAL: 1
METHOD: COUNTING FINGERS
OS_NORMAL: 1

## 2019-11-21 ASSESSMENT — CUP TO DISC RATIO
OD_RATIO: 0.3
OS_RATIO: 0.3

## 2019-11-21 ASSESSMENT — EXTERNAL EXAM - RIGHT EYE: OD_EXAM: NORMAL

## 2019-11-21 ASSESSMENT — VISUAL ACUITY
METHOD: SNELLEN - LINEAR
OD_SC: 20/20
OS_SC: 20/20

## 2019-11-21 ASSESSMENT — MIFFLIN-ST. JEOR: SCORE: 1769.47

## 2019-11-21 ASSESSMENT — SLIT LAMP EXAM - LIDS
COMMENTS: NORMAL
COMMENTS: NORMAL

## 2019-11-21 NOTE — PATIENT INSTRUCTIONS
1. You were seen in the ENT Clinic today by Dr. Samuel.  If you have any questions or concerns after your appointment, please call   - Option 1: ENT Clinic: [phone]  - Option 2: Michael (Dr. Samuel's Nurse): [phone]    2. Plan to return to clinic in 4-6wks; 12/14 at 2:40    Natice Schwab, RN  [facility]- Otolaryngology  [phone]

## 2019-11-21 NOTE — LETTER
2019         RE:  :  MRN: Luisito Asher  1980  [id number]     Dear Dr. Deluna,    Thank you for asking me to see your very pleasant patient, Luisito Asher, in neuro-ophthalmic consultation.  I would like to thank you for sending your records and I have summarized them in the history of present illness. My assessment and plan are below.  For further details, please see my attached clinic note.      Assessment & Plan     Luisito Asher is a 39 year old male with the following diagnoses:   1. Alternating esotropia    2. Sixth nerve palsy of right eye    3. Pituitary tumor    4. Visual field defect       Patient is a 39 year old sent by Dr. Deluna for consultation of diplopia after having endoscopic transnasal resection of pituitary macroadenoma on 19. He reports that before surgery he was having intermittent binocular horizontal diplopia. Since having surgery he reports that he has had improvement in his diplopia. He was evaluated in the ED at [facility] and found to have a pituitary macroadenoma with compression of the optic chiasm saw Dr. Deluna in clinic on 10/31/19 and then had surgery 19. Denies pre-operative ptosis, anisocoria, facial numbness. Denies post-operative facial numbness.     Surgical pathology showed a pituitary adenoma with hemorrhage and necrosis.    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 Intraocular pressure is normal both eyes. Pupils normal both eyes. Color plates full both eyes. Confrontational visual fields full both eyes. Sensorimotor examination consistent with a right sixth nerve palsy. Slit lamp exam unremarkable. Dilated fundus exam unremarkable both eyes.     Visual fields reliable in both eyes with non-specific defect both eyes. OCT rNFL with thinning superotemporally right eye; left eye with borderline thinning  temporally and inferotemporally.   It is my impression that Ld has a right sixth nerve palsy caused by compression from his pituitary macroadenoma. I personally reviewed patient's MRI previous to resection and it shows mild chiasmal compression by pituitary macroadenoma with mild amount of hemorrhage.  I would expect that this will gradually improve.  I gave him a pair of plano glasses with a 12 base out Fresnel prism over the right eye.  As he improves this will no longer be needed.  Recommend continued follow up with Dr. Deluna. Return to clinic in 4 months.       Again, thank you for allowing me to participate in the care of your patient.      Sincerely,    Almas Tomas MD  Professor  Ophthalmology Residency   Director of Neuro-Ophthalmology  [facility] - [employer] Endowed Chair  Departments of Ophthalmology, Neurology, and Neurosurgery  [facility] [address] [place] [address]  [address]  T - [phone]  F - [phone]  [email or web address]      CC: Steve Deluna MD  [address] [place], [place] [address] [address]  VIA In Basket     Antony Cullen [facility]  [address]  [facility] [address]  VIA Facsimile: [phone]       DX = sixth nerve palsy

## 2019-11-21 NOTE — NURSING NOTE
Chief Complaint   Patient presents with     Post-op Visit     Endoscopic transnasal resection of tumor, DOS 11/01     Temp 97.7  F (36.5  C) (Temporal)   Resp 15   Ht 1.829 m (6')   Wt 81.6 kg (180 lb)   BMI 24.41 kg/m      Annia Crawford CMA

## 2019-11-21 NOTE — PROGRESS NOTES
Assessment & Plan     Luisito Asher is a 39 year old male with the following diagnoses:   1. Alternating esotropia    2. Sixth nerve palsy of right eye    3. Pituitary tumor    4. Visual field defect         Patient is a 39 year old sent by Dr. Deluna for consultation of diplopia after having endoscopic transnasal resection of pituitary macroadenoma on 11/1/19. He reports that before surgery he was having intermittent binocular horizontal diplopia. Since having surgery he reports that he has had improvement in his diplopia. He was evaluated in the ED at [facility] and found to have a pituitary macroadenoma with compression of the optic chiasm saw Dr. Deluna in clinic on 10/31/19 and then had surgery 11/1/19. Denies pre-operative ptosis, anisocoria, facial numbness. Denies post-operative facial numbness.      Surgical pathology showed a pituitary adenoma with hemorrhage and necrosis.    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 Intraocular pressure is normal both eyes. Pupils normal both eyes. Color plates full both eyes. Confrontational visual fields full both eyes. Sensorimotor examination consistent with a right sixth nerve palsy. Slit lamp exam unremarkable. Dilated fundus exam unremarkable both eyes.     Visual fields reliable in both eyes with non-specific defect both eyes. OCT rNFL with thinning superotemporally right eye; left eye with borderline thinning temporally and inferotemporally.     It is my impression that Ld has a right sixth nerve palsy caused by compression from his pituitary macroadenoma. I personally reviewed patient's MRI previous to resection and it shows mild chiasmal compression by pituitary macroadenoma with mild amount of hemorrhage.  I would expect that this will gradually improve.  I gave him a pair of plano glasses with a 12 base out  Fresnel prism over the right eye.  As he improves this will no longer be needed.  Recommend continued follow up with Dr. Deluna. Return to clinic in 4 months.          Attending Physician Attestation:  Complete documentation of historical and exam elements from today's encounter can be found in the full encounter summary report (not reduplicated in this progress note).  I personally obtained the chief complaint(s) and history of present illness.  I confirmed and edited as necessary the review of systems, past medical/surgical history, family history, social history, and examination findings as documented by others; and I examined the patient myself.  I personally reviewed the relevant tests, images, and reports as documented above.  I formulated and edited as necessary the assessment and plan and discussed the findings and management plan with the patient and family. - Almas Husain MD  Ophthalmology, PGY-5  Neuro-Ophthalmology Fellow

## 2019-11-21 NOTE — NURSING NOTE
Chief Complaint(s) and History of Present Illness(es)     New Patient     In both eyes (Consult for double vision and headaches post pituitary surgery).  Associated symptoms include double vision and headache.              Comments     Patient reports horizontal double vision that was present before and post pituitary surgery. However, patient states the double has improved after surgery.   Double worse in R>L gaze. Adopt right head turn for SBV. Alleviates double with an eye patch in primary.   +headaches  No glasses.     CASSANDRA Padron 11/21/2019 8:21 AM

## 2019-11-21 NOTE — PROGRESS NOTES
Otolaryngology Clinic      Name: Luisito Asher  MRN: [id number]  Age: 39 year old  : 2019     Diagnosis:   1.  Pituitary macroadenoma.   2.  Right sixth cranial nerve palsy.   3.  Pituitary apoplexia.       Treatment:  The patient underwent stealth-guided transnasal endoscopic excision of pituitary macroadenoma on 19. Right naso-septal flap placed back on the septum.    History of Present Illness:   Luisito Asher is a 39 year old male with a history of pituitary macroadenoma with right sixth cranial nerve palsy and pituitary apoplexia s/p transnasal endoscopic excision on 19 who presents for first post-op evaluation. The patient states that he had some nasal bleeding for a week following surgery but that this has resolved and he only has some moisture now. He does continue to have some mild headaches and is taking tylenol for these. He is still having some double vision, but it has improved. He had an ophthalmology appointment earlier today and was given new glasses with a prism lens. He is no longer wearing an eye patch.        Review of Systems:   Pertinent items are noted in HPI or as in patient entered ROS below, remainder of complete ROS is negative.    ENT ROS 11/15/2019   Neurology Headache   Eyes Double vision   Ears, Nose, Throat Nasal congestion or drainage, Coughing up blood         Physical Exam:   Temp 97.7  F (36.5  C) (Temporal)   Resp 15   Ht 1.829 m (6')   Wt 81.6 kg (180 lb)   BMI 24.41 kg/m       Constitutional: The patient was well-groomed and in no acute distress.    Skin: Warm and pink.    Neurologic: Alert and oriented x3. Cranial nerves III-XII within normal limits. Voice quality is normal.    Psychiatric: The patient's affect was calm, cooperative and appropriate.    Respiratory: Breathing comfortably without stridor or exertion of accessory muscles.    Eyes: Pupils were equal and reactive. Extraocular movements are intact.  Right VI CN much  improved. Still diplopia with right lateral gaze  Head: Normocephalic and atraumatic. No lesions or scars.    Ears: External auditory canals and tympanic membranes were clear.    Nose: Sinuses were nontender. Anterior rhinoscopy revealed midline septum and absence of purulence or polyps.    Oral cavity/Oropharynx: Normal tongue, floor of mouth, buccal mucosa and palate. No lesions or masses on inspection or palpation. No abnormal lymph tissue in the oropharynx.    Neck: The parotids are soft without masses. Supple with normal laryngeal and tracheal landmarks.    Lymphatic: There is no palpable lymphadenopathy or other masses in the neck or parotids.        Nasal endoscopy performed to examine the posterior nasal passages for post-op evaluation.    Verbal consent obtained. Topical anesthetic/decongestant solution applied per patient request.   A rigid endoscope was passed into each nasal cavity separately.  Findings are as follows:   Remove the arthur nasal splints. Septum in the midline. Posterior septectomy with secretions. Suctioned sphenoid sinus. Patient became dizzy and not feeling well. Procedure was stopped     Assessment and Plan:  Luisito Asher is a 39 year old male s/p pituitary macroadenoma excision. He is doing well today following surgery. His eyes appear to be tracking better and he is healing well. He did become lightheaded during the cleaning so I was unable to complete this. His blood pressure was 101/73 at that time. He should do saline rinses 3-4x a day. Continued restrictions. Plan for follow up in 4-6 weeks.        Scribe Disclosure:  Joao NORTH, am serving as a scribe to document services personally performed by Antony Diana MD at this visit, based upon the provider's statements to me. All documentation has been reviewed by the aforementioned provider prior to being entered into the official medical record.  \  The documentation recorded by the scribe accurately reflects  the services I personally performed and the decisions made by me.

## 2019-11-21 NOTE — TELEPHONE ENCOUNTER
Left message with direct number at [phone]  Yaakov Heller RN RN 9:50 AM 11/27/19    ---      Left message with direct number at [phone]  Pt may schedule with orthoptist for fresnel prism placement-- prefer Monday AM, but able to see other times during regular business hours per orthoptist   Yaakov Heller RN RN 10:29 AM 11/26/19         Health Call Center    Phone Message    May a detailed message be left on voicemail: yes    Reason for Call: Other: Pt was told to schedule an appt to have a Prism sticker put on.  Please schedule with tech     Action Taken: Message routed to:  [facility] eye clinic

## 2019-11-21 NOTE — LETTER
2019      RE: Luisito Asher  [address] [place] [address]       Otolaryngology Clinic  Name: Luisito Asher  MRN: [id number]  Age: 39 year old  : 2019     Diagnosis:   1.  Pituitary macroadenoma.   2.  Right sixth cranial nerve palsy.   3.  Pituitary apoplexia.     Treatment:  The patient underwent stealth-guided transnasal endoscopic excision of pituitary macroadenoma on 19. Right naso-septal flap placed back on the septum.    History of Present Illness:   Luisito Asher is a 39 year old male with a history of pituitary macroadenoma with right sixth cranial nerve palsy and pituitary apoplexia s/p transnasal endoscopic excision on 19 who presents for first post-op evaluation. The patient states that he had some nasal bleeding for a week following surgery but that this has resolved and he only has some moisture now. He does continue to have some mild headaches and is taking tylenol for these. He is still having some double vision, but it has improved. He had an ophthalmology appointment earlier today and was given new glasses with a prism lens. He is no longer wearing an eye patch.     Review of Systems:   Pertinent items are noted in HPI or as in patient entered ROS below, remainder of complete ROS is negative.    ENT ROS 11/15/2019   Neurology Headache   Eyes Double vision   Ears, Nose, Throat Nasal congestion or drainage, Coughing up blood      Physical Exam:   Temp 97.7  F (36.5  C) (Temporal)   Resp 15   Ht 1.829 m (6')   Wt 81.6 kg (180 lb)   BMI 24.41 kg/m        Constitutional: The patient was well-groomed and in no acute distress.    Skin: Warm and pink.    Neurologic: Alert and oriented x3. Cranial nerves III-XII within normal limits. Voice quality is normal.    Psychiatric: The patient's affect was calm, cooperative and appropriate.    Respiratory: Breathing comfortably without stridor or exertion of accessory muscles.    Eyes: Pupils  were equal and reactive. Extraocular movements are intact.   Right VI CN much improved. Still diplopia with right lateral gaze  Head: Normocephalic and atraumatic. No lesions or scars.    Ears: External auditory canals and tympanic membranes were clear.    Nose: Sinuses were nontender. Anterior rhinoscopy revealed midline septum and absence of purulence or polyps.    Oral cavity/Oropharynx: Normal tongue, floor of mouth, buccal mucosa and palate. No lesions or masses on inspection or palpation. No abnormal lymph tissue in the oropharynx.    Neck: The parotids are soft without masses. Supple with normal laryngeal and tracheal landmarks.    Lymphatic: There is no palpable lymphadenopathy or other masses in the neck or parotids.      Nasal endoscopy performed to examine the posterior nasal passages for post-op evaluation.    Verbal consent obtained. Topical anesthetic/decongestant solution applied per patient request.   A rigid endoscope was passed into each nasal cavity separately.  Findings are as follows:   Remove the arthur nasal splints. Septum in the midline. Posterior septectomy with secretions. Suctioned sphenoid sinus. Patient became dizzy and not feeling well. Procedure was stopped     Assessment and Plan:  Luisito Asher is a 39 year old male s/p pituitary macroadenoma excision. He is doing well today following surgery. His eyes appear to be tracking better and he is healing well. He did become lightheaded during the cleaning so I was unable to complete this. His blood pressure was 101/73 at that time. He should do saline rinses 3-4x a day. Continued restrictions. Plan for follow up in 4-6 weeks.     Scribe Disclosure:  Joao NORTH, am serving as a scribe to document services personally performed by Antony Diana MD at this visit, based upon the provider's statements to me. All documentation has been reviewed by the aforementioned provider prior to being entered into the official medical  record.  \documentation recorded by the scribe accurately reflects the services I personally performed and the decisions made by me.    During nasal debridement pt became light headed. Debridement stopped. Head lied back. Cold water and ice pack provided. Vitals taken (see doc flow) WNL. Pt improved as walked out to the lobby where his wife was waiting for him.     Natice Schwab, RN BSN

## 2019-11-21 NOTE — PROGRESS NOTES
During nasal debridement pt became light headed. Debridement stopped. Head lied back. Cold water and ice pack provided. Vitals taken (see doc flow) WNL. Pt improved as walked out to the lobby where his wife was waiting for him.     Natice Schwab, RN BSN

## 2019-11-21 NOTE — Clinical Note
11/21/2019       RE: Luisito Asher  [address] [place] [address]     Dear Colleague,    Thank you for referring your patient, Luisito Asher, to the EYE CLINIC at [facility]. Please see a copy of my visit note below.           Assessment & Plan     Luisito Asher is a 39 year old male with the following diagnoses:   1. Alternating esotropia    2. Subjective visual disturbance    3. Sixth nerve palsy of right eye    4. Pituitary tumor         Patient is a 39 year old sent by Dr. Deluna for consultation of diplopia after having endoscopic transnasal resection of pituitary macroadenoma on 11/1/19. He reports that before surgery he was having intermittent binocular horizontal diplopia. Since having surgery he reports that he has had improvement in his diplopia. He was evaluated in the ED at [facility] and found to have a pituitary macroadenoma with compression of the optic chiasm saw Dr. Deluna in clinic on 10/31/19 and then had surgery 11/1/19. Denies pre-operative ptosis, anisocoria, facial numbness. Denies post-operative facial numbness.      Surgical pathology showed a pituitary adenoma with hemorrhage and necrosis.    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 Intraocular pressure is normal both eyes. Pupils normal both eyes. Color plates full both eyes. Confrontational visual fields full both eyes. Sensorimotor examination consistent with a right sixth nerve palsy. Slit lamp exam unremarkable. Dilated fundus exam unremarkable both eyes.     Visual fields reliable in both eyes with non-specific defect both eyes. OCT rNFL with thinning superotemporally right eye; left eye with borderline thinning temporally and inferotemporally.     It is my impression that Ld has a right sixth nerve palsy caused by compression from his  pituitary macroadenoma. Subjectively, this is improving. Patient would like to pursue contact lens to blur the right eye and a pair of temporary glasses with Fresnel prism. Recommend continued follow up with Dr. Deluna. Return to clinic in 3 months.          @ATT@    Almas Husain MD  Ophthalmology, PGY-5  Neuro-Ophthalmology Fellow             Assessment & Plan     Luisito Asher is a 39 year old male with the following diagnoses:   1. Alternating esotropia    2. Sixth nerve palsy of right eye    3. Pituitary tumor    4. Visual field defect         Patient is a 39 year old sent by Dr. Deluna for consultation of diplopia after having endoscopic transnasal resection of pituitary macroadenoma on 11/1/19. He reports that before surgery he was having intermittent binocular horizontal diplopia. Since having surgery he reports that he has had improvement in his diplopia. He was evaluated in the ED at [facility] and found to have a pituitary macroadenoma with compression of the optic chiasm saw Dr. Deluna in clinic on 10/31/19 and then had surgery 11/1/19. Denies pre-operative ptosis, anisocoria, facial numbness. Denies post-operative facial numbness.      Surgical pathology showed a pituitary adenoma with hemorrhage and necrosis.    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 Intraocular pressure is normal both eyes. Pupils normal both eyes. Color plates full both eyes. Confrontational visual fields full both eyes. Sensorimotor examination consistent with a right sixth nerve palsy. Slit lamp exam unremarkable. Dilated fundus exam unremarkable both eyes.     Visual fields reliable in both eyes with non-specific defect both eyes. OCT rNFL with thinning superotemporally right eye; left eye with borderline thinning temporally and inferotemporally.     It is my impression that Ld has a  right sixth nerve palsy caused by compression from his pituitary macroadenoma. I personally reviewed patient's MRI previous to resection and it shows mild chiasmal compression by pituitary macroadenoma with mild amount of hemorrhage.  I would expect that this will gradually improve.  I gave him a pair of plano glasses with a 12 base out Fresnel prism over the right eye.  As he improves this will no longer be needed.  Recommend continued follow up with Dr. Deluna. Return to clinic in 4 months.          Attending Physician Attestation:  Complete documentation of historical and exam elements from today's encounter can be found in the full encounter summary report (not reduplicated in this progress note).  I personally obtained the chief complaint(s) and history of present illness.  I confirmed and edited as necessary the review of systems, past medical/surgical history, family history, social history, and examination findings as documented by others; and I examined the patient myself.  I personally reviewed the relevant tests, images, and reports as documented above.  I formulated and edited as necessary the assessment and plan and discussed the findings and management plan with the patient and family. - Almas Husain MD  Ophthalmology, PGY-5  Neuro-Ophthalmology Fellow      Again, thank you for allowing me to participate in the care of your patient.      Sincerely,    Almas Tomas MD

## 2019-11-21 NOTE — LETTER
2019       RE: Luisito Asher  [address] [place] [address]     Dear Colleague,    Thank you for referring your patient, Luisito Asher, to the [facility] EAR NOSE AND THROAT at [facility]. Please see a copy of my visit note below.      Otolaryngology Clinic      Name: Luisito Asher  MRN: [id number]  Age: 39 year old  : 2019     Diagnosis:   1.  Pituitary macroadenoma.   2.  Right sixth cranial nerve palsy.   3.  Pituitary apoplexia.       Treatment:  The patient underwent stealth-guided transnasal endoscopic excision of pituitary macroadenoma on 19. Right naso-septal flap placed back on the septum.    History of Present Illness:   Luisito Asher is a 39 year old male with a history of pituitary macroadenoma with right sixth cranial nerve palsy and pituitary apoplexia s/p transnasal endoscopic excision on 19 who presents for first post-op evaluation. The patient states that he had some nasal bleeding for a week following surgery but that this has resolved and he only has some moisture now. He does continue to have some mild headaches and is taking tylenol for these. He is still having some double vision, but it has improved. He had an ophthalmology appointment earlier today and was given new glasses with a prism lens. He is no longer wearing an eye patch.        Review of Systems:   Pertinent items are noted in HPI or as in patient entered ROS below, remainder of complete ROS is negative.    ENT ROS 11/15/2019   Neurology Headache   Eyes Double vision   Ears, Nose, Throat Nasal congestion or drainage, Coughing up blood         Physical Exam:   Temp 97.7  F (36.5  C) (Temporal)   Resp 15   Ht 1.829 m (6')   Wt 81.6 kg (180 lb)   BMI 24.41 kg/m        Constitutional: The patient was well-groomed and in no acute distress.    Skin: Warm and pink.    Neurologic: Alert and oriented x3. Cranial nerves III-XII within  normal limits. Voice quality is normal.    Psychiatric: The patient's affect was calm, cooperative and appropriate.    Respiratory: Breathing comfortably without stridor or exertion of accessory muscles.    Eyes: Pupils were equal and reactive. Extraocular movements are intact.   Right VI CN much improved. Still diplopia with right lateral gaze  Head: Normocephalic and atraumatic. No lesions or scars.    Ears: External auditory canals and tympanic membranes were clear.    Nose: Sinuses were nontender. Anterior rhinoscopy revealed midline septum and absence of purulence or polyps.    Oral cavity/Oropharynx: Normal tongue, floor of mouth, buccal mucosa and palate. No lesions or masses on inspection or palpation. No abnormal lymph tissue in the oropharynx.    Neck: The parotids are soft without masses. Supple with normal laryngeal and tracheal landmarks.    Lymphatic: There is no palpable lymphadenopathy or other masses in the neck or parotids.        Nasal endoscopy performed to examine the posterior nasal passages for post-op evaluation.    Verbal consent obtained. Topical anesthetic/decongestant solution applied per patient request.   A rigid endoscope was passed into each nasal cavity separately.  Findings are as follows:   Remove the arthur nasal splints. Septum in the midline. Posterior septectomy with secretions. Suctioned sphenoid sinus. Patient became dizzy and not feeling well. Procedure was stopped     Assessment and Plan:  Luisito Asher is a 39 year old male s/p pituitary macroadenoma excision. He is doing well today following surgery. His eyes appear to be tracking better and he is healing well. He did become lightheaded during the cleaning so I was unable to complete this. His blood pressure was 101/73 at that time. He should do saline rinses 3-4x a day. Continued restrictions. Plan for follow up in 4-6 weeks.        Scribe Disclosure:  MARY ANNE, Joao Goldman, am serving as a scribe to document services  personally performed by Antony Diana MD at this visit, based upon the provider's statements to me. All documentation has been reviewed by the aforementioned provider prior to being entered into the official medical record.  \documentation recorded by the scribe accurately reflects the services I personally performed and the decisions made by me.    During nasal debridement pt became light headed. Debridement stopped. Head lied back. Cold water and ice pack provided. Vitals taken (see doc flow) WNL. Pt improved as walked out to the lobby where his wife was waiting for him.     Natice Schwab, RN BSN         Again, thank you for allowing me to participate in the care of your patient.      Sincerely,    Antony Diana MD

## 2019-11-25 ENCOUNTER — CARE COORDINATION (OUTPATIENT)
Dept: NEUROSURGERY | Facility: CLINIC | Age: 39
End: 2019-11-25

## 2019-11-26 ENCOUNTER — PATIENT OUTREACH (OUTPATIENT)
Dept: NEUROSURGERY | Facility: CLINIC | Age: 39
End: 2019-11-26

## 2019-11-26 DIAGNOSIS — D35.2 BENIGN NEOPLASM OF PITUITARY GLAND AND CRANIOPHARYNGEAL DUCT (POUCH) (H): Primary | ICD-10-CM

## 2019-11-26 DIAGNOSIS — D35.3 BENIGN NEOPLASM OF PITUITARY GLAND AND CRANIOPHARYNGEAL DUCT (POUCH) (H): Primary | ICD-10-CM

## 2019-11-26 DIAGNOSIS — E03.9 HYPOTHYROIDISM, UNSPECIFIED TYPE: ICD-10-CM

## 2019-11-26 NOTE — PROGRESS NOTES
Mailed to patient and faxed to [employer] for Disability scanned to pt chart

## 2019-11-26 NOTE — PROGRESS NOTES
Returned call.    Pt asked if he needed to see an endocrine MD.  Writer spoke with MD and, yes, he will need an endocrine appt.  Writer put in a referral and sent a note to the endocrine pool.    Pt also talked about an intermittent tingling/heat when he sits of lies.  Writer did not hear the portion of the body was bothering him.  Message left.

## 2019-11-27 ENCOUNTER — TELEPHONE (OUTPATIENT)
Dept: ENDOCRINOLOGY | Facility: CLINIC | Age: 39
End: 2019-11-27

## 2019-11-27 ENCOUNTER — PATIENT OUTREACH (OUTPATIENT)
Dept: NEUROSURGERY | Facility: CLINIC | Age: 39
End: 2019-11-27

## 2019-11-27 NOTE — PROGRESS NOTES
Returned call.    Pt called and left his phone number.  He did not leave a specific message.  In the past he has called regarding an endocrine appt.  Referral in the chart.  Writer sent a note to the scheduling pool for endocrine.  Message left.

## 2019-11-27 NOTE — TELEPHONE ENCOUNTER
----- Message from Eleazar Gallego RN sent at 11/26/2019  1:46 PM CST -----  Regarding: Pt Needs an Appt with Endocrine  Hi,     Would someone reached out to Luisito and set him up with a endocrineologist?  He is s/p pituitary tumor resection on 11/1.    Thanks,  Eleazar

## 2019-12-03 ENCOUNTER — PATIENT OUTREACH (OUTPATIENT)
Dept: NEUROSURGERY | Facility: CLINIC | Age: 39
End: 2019-12-03

## 2019-12-03 RX ORDER — LEVOTHYROXINE SODIUM 50 UG/1
50 TABLET ORAL DAILY
Qty: 30 TABLET | Refills: 0 | Status: SHIPPED | OUTPATIENT
Start: 2019-12-03 | End: 2020-01-06

## 2019-12-03 NOTE — TELEPHONE ENCOUNTER
RECORDS RECEIVED FROM: Internal/CE   DATE RECEIVED: 2.12.20   NOTES (FOR ALL VISITS) STATUS DETAILS   OFFICE NOTES from referring provider Internal 11.26.19 Steve Deluna MD   OFFICE NOTES from other specialist Internal 11.21.19   ED NOTES N/A    OPERATIVE REPORT  (thyroid, pituitary, adrenal, parathyroid) Internal 11.1.19   MEDICATION LIST Internal    IMAGING      DEXASCAN N/A    MRI (BRAIN) Internal/CE 10.23.19     XR (Chest) N/A    CT (HEAD/NECK/CHEST/ABDOMEN) Internal/CE 10.23.19     NUCLEAR  N/A    ULTRASOUND (HEAD/NECK) N/A    LABS     DIABETES: HBGA1C, CREATININE, FASTING LIPIDS, MICROALBUMIN URINE, POTASSIUM, TSH, T4    THYROID: TSH, T4, CBC, THYRODLONULIN, TOTAL T3, FREE T4, CALCITONIN, CEA Internal   10.25.19

## 2019-12-03 NOTE — PROGRESS NOTES
Returned call.    Endocrinology has contacted the pt regarding setting up an appt.  A refill for Levothyroxine was e-prescribe to [facility] [address] [place].  MD will manage medication until pt has an appt with endocrine.

## 2019-12-13 NOTE — PROGRESS NOTES
OCCUPATIONAL THERAPY VISUAL REHABILITATION DISCHARGE SUMMARY     Patient: Luisito Asher  : 1980  Insurance:   Payor/Plan Subscriber Name Rel Member # Group #   BC[employer] - [employer] [facility] [place] [id number]       [address] [id number]       Beginning/End Dates of Reporting Period:  19 to 2019    Therapy Diagnosis:   Therapy  Diagnosis: Impaired ADL/IADL with deficits in: Reading based ADL, Financial management, Work performance(Functional and community mobility)  Impairment comments: Decreased visual skills affecting ADL/IADL performance    Client Self Report: This writing OTR spoke with patient on the phone on 2019 and overall patient is doing well.  Patient saw the neuro-ophthalmologist and patient has a temporary prism.  He just went back to work on 2019.  It was decided that patient would call back by mid December if he was having difficulty and needed to return to vision rehab services.  As of 2019, patient has not called back.  Will discharge his chart    GOALS     Goal 1 - Near vision        Near Vision Goal Comment: Patient to independently verbalize and/or successfully demonstrate at least 2 strategies and adapted techniques to effectively compensate for his diplopia during functional tasks (reading, scanning.) for increased independence and safety with ADL/IADLs.   Target Date: 20        Goal 2 - Visual field                     Goal 3 - Environmental modification                     Goal 4 - Resource education                     Goal 5 - Distance viewing                     Goal 6 - Cognition                     Goal 7    Goal Description: Patient will demonstrate WFLs visual reaction time and visual scanning skills for extrapersonal space with use of compensatory strategies prn, for safe functional and community mobility tasks (navigating in new areas, crossing the street, driving, grocery shopping) by scoring WNLs on all modes of the Dynavision  and Scancourse.   Target Date: 01/02/20        Goal 8                   Progress Toward Goals  All goals met    Reason for Discharge  Patient has met all goals.    Adaptive Equipment/Optical Devices Recommended:  N/A    Discharge Plan  Patient to continue home program/techniques

## 2019-12-19 ENCOUNTER — OFFICE VISIT (OUTPATIENT)
Dept: OTOLARYNGOLOGY | Facility: CLINIC | Age: 39
End: 2019-12-19
Payer: COMMERCIAL

## 2019-12-19 VITALS
SYSTOLIC BLOOD PRESSURE: 116 MMHG | DIASTOLIC BLOOD PRESSURE: 79 MMHG | WEIGHT: 194 LBS | BODY MASS INDEX: 26.28 KG/M2 | HEIGHT: 72 IN

## 2019-12-19 DIAGNOSIS — D49.7 PITUITARY TUMOR: Primary | ICD-10-CM

## 2019-12-19 ASSESSMENT — MIFFLIN-ST. JEOR: SCORE: 1833

## 2019-12-19 ASSESSMENT — PAIN SCALES - GENERAL: PAINLEVEL: NO PAIN (0)

## 2019-12-19 NOTE — PATIENT INSTRUCTIONS
1. You were seen in the ENT Clinic today by Dr. Samuel.  If you have any questions or concerns after your appointment, please call   - Option 1: ENT Clinic: [phone]  - Option 2: Michael (Dr. Samuel's Nurse): [phone]    2. Plan to return to clinic as needed    3. Use the nasal saline for another 3-4 weeks    4. Neurosurgery will be calling you to schedule follow-up appointment in Feb. With an MRI prior to your appointment     Natice Schwab, RN  [facility] Otolaryngology  [phone]

## 2019-12-19 NOTE — PROGRESS NOTES
"  Otolaryngology Clinic      Name: Luisito Asher  MRN: [id number]  Age: 39 year old  : 2019     Diagnosis:   1.  Pituitary macroadenoma.   2.  Right sixth cranial nerve palsy.   3.  Pituitary apoplexia.     Treatment:  The patient underwent stealth-guided transnasal endoscopic excision of pituitary macroadenoma on 19. Right naso-septal flap placed back on the septum.    History of Present Illness:   Luisito Asher is a 39 year old male 7 weeks s/p the above procedure who presents for follow up. The patient notes that he continues to have improvement in his diplopia, noting this is exacerbated when he looks far to the right. States that his nasal symptoms have been improving. He notes that he still has diminished olfactory and gustatory sensation. The patient also states that he is feeling fatigued, body chills, muscle aches. He states that he isn't scheduled to meet with an endocrinologist until February. He also notes that he has not scheduled a follow up appointment with Dr. Deluna as of yet. He notes that he is on \"thyroid medication\", stating he is taking this once a day. Of note, Kayla Review shows the patient was represcribed 50 mcg of levothyroxine on 12/3/2019. Denies any nasal crusting/discharge.      Review of Systems:   Pertinent items are noted in HPI or as in patient entered ROS below, remainder of complete ROS is negative.     Physical Exam:   Constitutional: The patient was well-groomed and in no acute distress.    Skin: Warm and pink.    Neurologic: Alert and oriented x3. Cranial nerves III-XII within normal limits. Voice quality is normal.    Psychiatric: The patient's affect was calm, cooperative and appropriate.    Respiratory: Breathing comfortably without stridor or exertion of accessory muscles.    Eyes: Pupils were equal and reactive. Extraocular movements are intact.    Head: Normocephalic and atraumatic. No lesions or scars.    Nose: Sinuses were nontender. " Anterior rhinoscopy revealed midline septum and absence of purulence or polyps.    Oral cavity/Oropharynx: Normal tongue, floor of mouth, buccal mucosa and palate. No lesions or masses on inspection or palpation. No abnormal lymph tissue in the oropharynx.    Neck: The parotids are soft without masses. Supple with normal laryngeal and tracheal landmarks.    Lymphatic: There is no palpable lymphadenopathy or other masses in the neck or parotids.      Nasal endoscopy performed to examine the posterior nasal passages for recent pituitary surgery.    Verbal consent obtained. Topical anesthetic/decongestant solution applied per patient request.   A rigid endoscope was passed into each nasal cavity separately.  Findings are as follows:   Septum in the midline. Rescue flaps well healed. Sphenoid sinus with crust and mucus removed today. Granulation tissue and some bleeding controlled with silver nitrate. Nasopharynx:  Clear, no lesions, crusting or inflammation     Assessment and Plan:  Luisito Asher is a 39 year old male 7 weeks s/p the above procedure who presents for follow up. He reports his diplopia is improving, and his exam today shows he is progressing appropriately. I want him to continue with his nasal saline rinses. I discussed with him that his decreased sensation of taste and smell is normal at this point in the post-operative course. There are no restrictions at this point, gradual graduated return is ok.     The patient is experiencing some endocrinological symptoms including fatigue and body chills that I think may be representative of hypothyroidism secondary to his pituitary macroadenoma resection. He is currently on 50 mcg of levothyroxine and this will likely need to be increased. We encouraged the patient to reach out to endocrinology to try and get his appointment on 02/12/2020 moved up if possible. We will also provided a referral to help schedule a follow up with Dr. Deluna and obtain a 3  month post-op MRI.      Scribe Disclosure:  I, Palmer Vazquez, am serving as a scribe to document services personally performed by Antony Diana MD at this visit, based upon the provider's statements to me. All documentation has been reviewed by the aforementioned provider prior to being entered into the official medical record.    The documentation recorded by the scribe accurately reflects the services I personally performed and the decisions made by me.

## 2019-12-19 NOTE — LETTER
"2019       RE: Luisito Asher  [address] [place] [address]     Dear Colleague,    Thank you for referring your patient, Luisito Asher, to the [facility] EAR NOSE AND THROAT at [facility]. Please see a copy of my visit note below.      Otolaryngology Clinic      Name: Luisito Asher  MRN: [id number]  Age: 39 year old  : 2019     Diagnosis:   1.  Pituitary macroadenoma.   2.  Right sixth cranial nerve palsy.   3.  Pituitary apoplexia.     Treatment:  The patient underwent stealth-guided transnasal endoscopic excision of pituitary macroadenoma on 19. Right naso-septal flap placed back on the septum.    History of Present Illness:   Luisito Asher is a 39 year old male 7 weeks s/p the above procedure who presents for follow up. The patient notes that he continues to have improvement in his diplopia, noting this is exacerbated when he looks far to the right. States that his nasal symptoms have been improving. He notes that he still has diminished olfactory and gustatory sensation. The patient also states that he is feeling fatigued, body chills, muscle aches. He states that he isn't scheduled to meet with an endocrinologist until February. He also notes that he has not scheduled a follow up appointment with Dr. Deluna as of yet. He notes that he is on \"thyroid medication\", stating he is taking this once a day. Of note, Kayla Review shows the patient was represcribed 50 mcg of levothyroxine on 12/3/2019. Denies any nasal crusting/discharge.      Review of Systems:   Pertinent items are noted in HPI or as in patient entered ROS below, remainder of complete ROS is negative.     Physical Exam:   Constitutional: The patient was well-groomed and in no acute distress.    Skin: Warm and pink.    Neurologic: Alert and oriented x3. Cranial nerves III-XII within normal limits. Voice quality is normal.    Psychiatric: The patient's " affect was calm, cooperative and appropriate.    Respiratory: Breathing comfortably without stridor or exertion of accessory muscles.    Eyes: Pupils were equal and reactive. Extraocular movements are intact.    Head: Normocephalic and atraumatic. No lesions or scars.    Nose: Sinuses were nontender. Anterior rhinoscopy revealed midline septum and absence of purulence or polyps.    Oral cavity/Oropharynx: Normal tongue, floor of mouth, buccal mucosa and palate. No lesions or masses on inspection or palpation. No abnormal lymph tissue in the oropharynx.    Neck: The parotids are soft without masses. Supple with normal laryngeal and tracheal landmarks.    Lymphatic: There is no palpable lymphadenopathy or other masses in the neck or parotids.      Nasal endoscopy performed to examine the posterior nasal passages for recent pituitary surgery.    Verbal consent obtained. Topical anesthetic/decongestant solution applied per patient request.   A rigid endoscope was passed into each nasal cavity separately.  Findings are as follows:   Septum in the midline. Rescue flaps well healed. Sphenoid sinus with crust and mucus removed today. Granulation tissue and some bleeding controlled with silver nitrate. Nasopharynx:  Clear, no lesions, crusting or inflammation     Assessment and Plan:  Luisito Asher is a 39 year old male 7 weeks s/p the above procedure who presents for follow up. He reports his diplopia is improving, and his exam today shows he is progressing appropriately. I want him to continue with his nasal saline rinses. I discussed with him that his decreased sensation of taste and smell is normal at this point in the post-operative course. There are no restrictions at this point, gradual graduated return is ok.     The patient is experiencing some endocrinological symptoms including fatigue and body chills that I think may be representative of hypothyroidism secondary to his pituitary macroadenoma resection. He is  currently on 50 mcg of levothyroxine and this will likely need to be increased. We encouraged the patient to reach out to endocrinology to try and get his appointment on 02/12/2020 moved up if possible. We will also provided a referral to help schedule a follow up with Dr. Deluna and obtain a 3 month post-op MRI.      Scribe Disclosure:  I, Palmer Vazquez, am serving as a scribe to document services personally performed by Antony Diana MD at this visit, based upon the provider's statements to me. All documentation has been reviewed by the aforementioned provider prior to being entered into the official medical record.    The documentation recorded by the scribe accurately reflects the services I personally performed and the decisions made by me.          Again, thank you for allowing me to participate in the care of your patient.      Sincerely,    Antony Diana MD

## 2019-12-31 NOTE — DISCHARGE SUMMARY
Admit Date:     11/01/2019   Discharge Date:     11/04/2019      ADMISSION DIAGNOSES:   1.  Pituitary macroadenoma.   2.  Pituitary apoplexy.   3.  Right sixth nerve palsy.      DISCHARGE DIAGNOSES:   1.  Pituitary macroadenoma.   2.  Pituitary apoplexy.   3.  Right sixth nerve palsy.      PROCEDURE:  Endoscopic, transnasal resection of hemorrhagic pituitary macroadenoma on 11/01/2019.      HISTORY OF PRESENT ILLNESS:  Mr. Asher is a 39-year-old man who awoke with sudden onset of headache about 1 week ago.  He developed double vision due to a right sixth nerve palsy.  MRI showed a large sellar tumor with extension into the right parasellar space.  He presents for an endoscopic transnasal approach for resection.      HOSPITAL COURSE:  The patient underwent the above-stated procedure on 11/01/2019.  The procedure went well, with a good decompression of the sella and parasellar spaces.  There was no CSF leak.  Postoperatively, the patient had intermittent headaches but was neurologically stable.  His right sixth nerve palsy was unchanged.  He had no new neurological deficits.  He did not show any signs of diabetes insipidus.  He was continued on dexamethasone 2 mg q.6h. and levothyroxine.  He was also maintained on pantoprazole while on the dexamethasone.  At the time of discharge, he was ambulating well.  He was tolerating a regular diet.  His pain was adequately controlled.      DISPOSITION:  Home.      FOLLOWUP:  He will see Dr. Samuel in 3 weeks for Lentz splint removal.  Instructions were given regarding monitoring for CSF leak and diabetes insipidus.  He will also follow up with Neurophthalmology in the next few weeks.  He will have the first postoperative MRI in about 3 months when he follows up in Neurosurgery Clinic with Dr. Morrell.         KATI MORRELL MD             D: 12/30/2019   T: 12/30/2019   MT: BOB      Name:     AMRIT ASHER   MRN:      [id number]        Account:         [id number]   :      1980           Admit Date:     2019                                  Discharge Date: 2019      Document: [id number]

## 2020-01-02 ENCOUNTER — PATIENT OUTREACH (OUTPATIENT)
Dept: NEUROSURGERY | Facility: CLINIC | Age: 40
End: 2020-01-02

## 2020-01-06 DIAGNOSIS — E03.9 HYPOTHYROIDISM, UNSPECIFIED TYPE: ICD-10-CM

## 2020-01-06 RX ORDER — LEVOTHYROXINE SODIUM 50 UG/1
50 TABLET ORAL DAILY
Qty: 30 TABLET | Refills: 0 | Status: SHIPPED | OUTPATIENT
Start: 2020-01-06 | End: 2020-01-30

## 2020-01-09 ENCOUNTER — OFFICE VISIT (OUTPATIENT)
Dept: NEUROSURGERY | Facility: CLINIC | Age: 40
End: 2020-01-09
Payer: COMMERCIAL

## 2020-01-09 ENCOUNTER — ANCILLARY PROCEDURE (OUTPATIENT)
Dept: MRI IMAGING | Facility: CLINIC | Age: 40
End: 2020-01-09
Attending: NEUROLOGICAL SURGERY
Payer: COMMERCIAL

## 2020-01-09 ENCOUNTER — APPOINTMENT (OUTPATIENT)
Dept: LAB | Facility: CLINIC | Age: 40
End: 2020-01-09
Payer: COMMERCIAL

## 2020-01-09 VITALS
WEIGHT: 186 LBS | HEIGHT: 72 IN | SYSTOLIC BLOOD PRESSURE: 128 MMHG | BODY MASS INDEX: 25.19 KG/M2 | DIASTOLIC BLOOD PRESSURE: 85 MMHG | HEART RATE: 79 BPM

## 2020-01-09 DIAGNOSIS — D35.3 BENIGN NEOPLASM OF PITUITARY GLAND AND CRANIOPHARYNGEAL DUCT (POUCH) (H): ICD-10-CM

## 2020-01-09 DIAGNOSIS — D35.2 BENIGN NEOPLASM OF PITUITARY GLAND AND CRANIOPHARYNGEAL DUCT (POUCH) (H): ICD-10-CM

## 2020-01-09 DIAGNOSIS — D35.2 BENIGN NEOPLASM OF PITUITARY GLAND AND CRANIOPHARYNGEAL DUCT (POUCH) (H): Primary | ICD-10-CM

## 2020-01-09 DIAGNOSIS — D35.3 BENIGN NEOPLASM OF PITUITARY GLAND AND CRANIOPHARYNGEAL DUCT (POUCH) (H): Primary | ICD-10-CM

## 2020-01-09 DIAGNOSIS — D35.2 PITUITARY MACROADENOMA (H): Primary | ICD-10-CM

## 2020-01-09 LAB
ANION GAP SERPL CALCULATED.3IONS-SCNC: 3 MMOL/L (ref 3–14)
BUN SERPL-MCNC: 18 MG/DL (ref 7–30)
CALCIUM SERPL-MCNC: 9.3 MG/DL (ref 8.5–10.1)
CHLORIDE SERPL-SCNC: 108 MMOL/L (ref 94–109)
CO2 SERPL-SCNC: 28 MMOL/L (ref 20–32)
CORTIS SERPL-MCNC: 6.4 UG/DL (ref 4–22)
CREAT SERPL-MCNC: 0.96 MG/DL (ref 0.66–1.25)
FSH SERPL-ACNC: 3 IU/L (ref 0.7–10.8)
GFR SERPL CREATININE-BSD FRML MDRD: >90 ML/MIN/{1.73_M2}
GLUCOSE SERPL-MCNC: 76 MG/DL (ref 70–99)
LH SERPL-ACNC: 1.7 IU/L (ref 1.5–9.3)
POTASSIUM SERPL-SCNC: 4.2 MMOL/L (ref 3.4–5.3)
PROLACTIN SERPL-MCNC: 6 UG/L (ref 2–18)
SODIUM SERPL-SCNC: 139 MMOL/L (ref 133–144)
T4 FREE SERPL-MCNC: 0.87 NG/DL (ref 0.76–1.46)
TSH SERPL DL<=0.005 MIU/L-ACNC: 0.57 MU/L (ref 0.4–4)

## 2020-01-09 RX ORDER — GADOBUTROL 604.72 MG/ML
10 INJECTION INTRAVENOUS ONCE
Status: COMPLETED | OUTPATIENT
Start: 2020-01-09 | End: 2020-01-09

## 2020-01-09 RX ADMIN — GADOBUTROL 10 ML: 604.72 INJECTION INTRAVENOUS at 11:05

## 2020-01-09 ASSESSMENT — MIFFLIN-ST. JEOR: SCORE: 1796.69

## 2020-01-09 ASSESSMENT — PAIN SCALES - GENERAL: PAINLEVEL: NO PAIN (0)

## 2020-01-09 NOTE — DISCHARGE INSTRUCTIONS
MRI Contrast Discharge Instructions    The IV contrast you received today will pass out of your body in your  urine. This will happen in the next 24 hours. You will not feel this process.  Your urine will not change color.    Drink at least 4 extra glasses of water or juice today (unless your doctor  has restricted your fluids). This reduces the stress on your kidneys.  You may take your regular medicines.    If you are on dialysis: It is best to have dialysis today.    If you have a reaction: Most reactions happen right away. If you have  any new symptoms after leaving the hospital (such as hives or swelling),  call your hospital at the correct number below. Or call your family doctor.  If you have breathing distress or wheezing, call 911.    Special instructions: ***    I have read and understand the above information.    Signature:______________________________________ Date:___________    Staff:__________________________________________ Date:___________     Time:__________    [place] [facility] Departments:    ___[place]: [phone]  ___[facility]: [phone]  ___[place]: [phone] ___[facility]: [phone]  ___[facility]: [phone]  ___[facility]: [phone]  ___[phone]  ___[facility]: [phone]  ___[phone]

## 2020-01-09 NOTE — NURSING NOTE
Chief Complaint   Patient presents with     RECHECK     follow up      Toshia Liriano CMA  Patient Care Supervisor  Endocrinology & Diabetes   Neurology, Neurosurgery, PM&R

## 2020-01-09 NOTE — LETTER
1/9/2020       RE: Luisito Asher  [address] [place] [address]     Dear Colleague,    Thank you for referring your patient, Luisito Asher, to the [facility] NEUROSURGERY at [facility]. Please see a copy of my visit note below.    Dear Dr. Cullen:    We saw Mr. Luisito Asher back today in Pituitary Neurosurgery Clinic for MRI follow-up of his pituitary macroadenoma, which was resected on 11/01/2019 through an endoscopic, transnasal approach. He had a mild case of pituitary apoplexy and right sixth nerve palsy resulting in diplopia. There were no intraoperative or post-operative complications.    Currently, he is doing well. He believes his diplopia has resolved. His headaches have mostly subsided and are tolerable if they occur. He remains on levothyroxine. He will be seeing Dr. Gomez on 02/12/2020. His primary complaint at this time is diffuse myalgias, which are independent of activity. His libido remains low but he is still able to achieve erections. He returned to work a month ago.    PHYSICAL EXAM: On exam, his general appearance is good. Extraocular movements are normal. There is no ptosis. The remainder of his gross neurological examination is normal.    REVIEW OF STUDIES: We see post-surgical changes in the sphenoid sinus. We see no evidence of residual tumor. The pituitary stalk and gland appear anatomically intact and are deviated to the left.     IMPRESSION AND PLAN: It is very unlikely that a hemorrhagic adenoma such as this will recur. We will repeat an MRI in 2 years for completeness. We will obtain a pituitary panel on his way out today as well as a BMP. We will review the results with Dr. Gomez if we have concerns.     We will keep you informed of his progress. Please do not hesitate to contact us with questions.    MD MARY ANNE SZYMANSKI, Lea Willingham, am serving as a scribe to document services personally performed by Steve  MD Mikie, based upon my observations and the provider's statements to me. All documentation has been reviewed by the aforementioned doctor prior to being entered into the official medical record.

## 2020-01-09 NOTE — PATIENT INSTRUCTIONS
Please have blood drawn for a basic metabolic and pituitary panel.       Follow up with Dr. Deluna in two years with a MRI prior to the appointment

## 2020-01-09 NOTE — PROGRESS NOTES
Dear Dr. Cullen:    We saw Mr. Luisito Asher back today in Pituitary Neurosurgery Clinic for MRI follow-up of his pituitary macroadenoma, which was resected on 11/01/2019 through an endoscopic, transnasal approach. He had a mild case of pituitary apoplexy and right sixth nerve palsy resulting in diplopia. There were no intraoperative or post-operative complications.    Currently, he is doing well. He believes his diplopia has resolved. His headaches have mostly subsided and are tolerable if they occur. He remains on levothyroxine. He will be seeing Dr. Gomez on 02/12/2020. His primary complaint at this time is diffuse myalgias, which are independent of activity. His libido remains low but he is still able to achieve erections. He returned to work a month ago.    PHYSICAL EXAM: On exam, his general appearance is good. Extraocular movements are normal. There is no ptosis. The remainder of his gross neurological examination is normal.    REVIEW OF STUDIES: We see post-surgical changes in the sphenoid sinus. We see no evidence of residual tumor. The pituitary stalk and gland appear anatomically intact and are deviated to the left.     IMPRESSION AND PLAN: It is very unlikely that a hemorrhagic adenoma such as this will recur. We will repeat an MRI in 2 years for completeness. We will obtain a pituitary panel on his way out today as well as a BMP. We will review the results with Dr. Gomez if we have concerns.     We will keep you informed of his progress. Please do not hesitate to contact us with questions.    STEVE MORRELL MD    I, Lea Willingham, am serving as a scribe to document services personally performed by Steve Morrell MD, based upon my observations and the provider's statements to me. All documentation has been reviewed by the aforementioned doctor prior to being entered into the official medical record.

## 2020-01-10 LAB
ACTH PLAS-MCNC: 13 PG/ML
IGF-I BLD-MCNC: 42 NG/ML (ref 83–238)

## 2020-01-11 LAB — TESTOST SERPL-MCNC: 241 NG/DL (ref 240–950)

## 2020-01-15 ENCOUNTER — MYC MEDICAL ADVICE (OUTPATIENT)
Dept: NEUROSURGERY | Facility: CLINIC | Age: 40
End: 2020-01-15

## 2020-01-15 LAB
A-PGH SER-MCNC: 0.2 NG/ML
GH SERPL-MCNC: 0.2 UG/L

## 2020-01-17 NOTE — TELEPHONE ENCOUNTER
Returned call.    Except for insulin growth factor, which is low, all the other labs of the pituitary panel are WNL.  Pt c/o of muscle cramps.  Pt has an appt with endocrinology on 2/12.  Writer will let MD know about lab results and send a note to endocrine.

## 2020-01-26 DIAGNOSIS — E03.9 HYPOTHYROIDISM, UNSPECIFIED TYPE: ICD-10-CM

## 2020-01-27 NOTE — TELEPHONE ENCOUNTER
Rx Authorization:    Requested Medication/ Dose levothyroxine (SYNTHROID/LEVOTHROID) 50 MCG tablet    Date last refill ordered: 1/6/20    Quantity ordered: 30    # refills: 0    Date of last clinic visit with ordering provider: 1/9/20    Date of next clinic visit with ordering provider: 3/26/20    All pertinent protocol data (lab date/result):     Include pertinent information from patients message:

## 2020-01-28 NOTE — PROGRESS NOTES
Continue topiramate and follow-up with Dr. Romina Rodriguez Pt has an 11:15 appt at the [facility] in [place] to have his sodium level drawn.  Writer will follow up.

## 2020-01-30 DIAGNOSIS — E03.9 HYPOTHYROIDISM, UNSPECIFIED TYPE: ICD-10-CM

## 2020-01-30 RX ORDER — LEVOTHYROXINE SODIUM 50 UG/1
50 TABLET ORAL DAILY
Qty: 30 TABLET | Refills: 0 | Status: SHIPPED | OUTPATIENT
Start: 2020-01-30 | End: 2020-02-18

## 2020-02-03 RX ORDER — LEVOTHYROXINE SODIUM 50 UG/1
TABLET ORAL
Qty: 30 TABLET | Refills: 0 | Status: SHIPPED | OUTPATIENT
Start: 2020-02-03 | End: 2020-02-16 | Stop reason: DRUGHIGH

## 2020-02-12 ENCOUNTER — PRE VISIT (OUTPATIENT)
Dept: ENDOCRINOLOGY | Facility: CLINIC | Age: 40
End: 2020-02-12

## 2020-02-12 ENCOUNTER — OFFICE VISIT (OUTPATIENT)
Dept: ENDOCRINOLOGY | Facility: CLINIC | Age: 40
End: 2020-02-12
Attending: NEUROLOGICAL SURGERY
Payer: COMMERCIAL

## 2020-02-12 VITALS
WEIGHT: 193.9 LBS | SYSTOLIC BLOOD PRESSURE: 123 MMHG | DIASTOLIC BLOOD PRESSURE: 74 MMHG | HEART RATE: 73 BPM | BODY MASS INDEX: 26.3 KG/M2

## 2020-02-12 DIAGNOSIS — R79.89 LOW IGF-1 LEVEL: ICD-10-CM

## 2020-02-12 DIAGNOSIS — E29.1 HYPOGONADISM MALE: ICD-10-CM

## 2020-02-12 DIAGNOSIS — D35.3 BENIGN NEOPLASM OF PITUITARY GLAND AND CRANIOPHARYNGEAL DUCT (POUCH) (H): ICD-10-CM

## 2020-02-12 DIAGNOSIS — E03.8 SECONDARY HYPOTHYROIDISM: ICD-10-CM

## 2020-02-12 DIAGNOSIS — D35.2 BENIGN NEOPLASM OF PITUITARY GLAND AND CRANIOPHARYNGEAL DUCT (POUCH) (H): ICD-10-CM

## 2020-02-12 DIAGNOSIS — E87.1 HYPONATREMIA: ICD-10-CM

## 2020-02-12 DIAGNOSIS — E23.6 PITUITARY APOPLEXY (H): Primary | ICD-10-CM

## 2020-02-12 DIAGNOSIS — E23.6 PITUITARY APOPLEXY (H): ICD-10-CM

## 2020-02-12 LAB
ALBUMIN SERPL-MCNC: 4 G/DL (ref 3.4–5)
ALP SERPL-CCNC: 64 U/L (ref 40–150)
ALT SERPL W P-5'-P-CCNC: 46 U/L (ref 0–70)
ANION GAP SERPL CALCULATED.3IONS-SCNC: 3 MMOL/L (ref 3–14)
AST SERPL W P-5'-P-CCNC: 25 U/L (ref 0–45)
BILIRUB SERPL-MCNC: 0.4 MG/DL (ref 0.2–1.3)
BUN SERPL-MCNC: 17 MG/DL (ref 7–30)
CALCIUM SERPL-MCNC: 9.1 MG/DL (ref 8.5–10.1)
CHLORIDE SERPL-SCNC: 108 MMOL/L (ref 94–109)
CO2 SERPL-SCNC: 30 MMOL/L (ref 20–32)
CORTIS SERPL-MCNC: 4.4 UG/DL (ref 4–22)
CREAT SERPL-MCNC: 0.94 MG/DL (ref 0.66–1.25)
FSH SERPL-ACNC: 3 IU/L (ref 0.7–10.8)
GFR SERPL CREATININE-BSD FRML MDRD: >90 ML/MIN/{1.73_M2}
GLUCOSE SERPL-MCNC: 94 MG/DL (ref 70–99)
LH SERPL-ACNC: 1.6 IU/L (ref 1.5–9.3)
POTASSIUM SERPL-SCNC: 3.8 MMOL/L (ref 3.4–5.3)
PROT SERPL-MCNC: 7.1 G/DL (ref 6.8–8.8)
SODIUM SERPL-SCNC: 140 MMOL/L (ref 133–144)
T4 FREE SERPL-MCNC: 0.67 NG/DL (ref 0.76–1.46)
TSH SERPL DL<=0.005 MIU/L-ACNC: 0.89 MU/L (ref 0.4–4)

## 2020-02-12 ASSESSMENT — PAIN SCALES - GENERAL: PAINLEVEL: NO PAIN (0)

## 2020-02-12 NOTE — LETTER
2/12/2020       RE: Luisito Asher  [address] [place] [address]     Dear Colleague,    Thank you for referring your patient, Luisito Asher, to the [facility] ENDOCRINOLOGY at [facility]. Please see a copy of my visit note below.                                                                               - Endocrinology Initial Consultation -    Reason for visit/consult:  Benign neoplasm of pituitary gland and craniopharyngeal duct (pouch) (H)    Primary care provider: Antony Cullen    HPI: A 38 yo male here with post pituitary apoplexy follow up. Referral from Dr. Deluna.   Patient had sudden onset of HA and right side vision disturbance, and he went to ER and found out pituitary tumor. He went to Neurologist office next day then he was recommended Dr. Deluna and next day patient went to [facility] (11/01/2019). Perioperative no complications, his right double vision persisted until early 12/2019 and currently resolved.     Back to work. 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     Appetite: good  Sleep: good wakes up 1-2 times a night for bathroom  No polyuria no polydipsia  Energy level: OK but not high (60% to prior to surgery)  Libido: low for 2 -3 years  Erectile function: ok  Muscle strength: feel weak  General body pain  Joint pain: yes        Past Medical/Surgical History:  No past medical history on file.  Past Surgical History:   Procedure Laterality Date     OPTICAL TRACKING SYSTEM ENDOSCOPIC RESECTION TUMOR CRANIAL N/A 11/1/2019    Procedure: Endoscopic transnasal resection of tumor;  Surgeon: Steve Deluna MD;  Location:  OR       Allergies:  No Known Allergies    Current Medications   Current Outpatient Medications   Medication     levothyroxine (SYNTHROID/LEVOTHROID) 50 MCG tablet     acetaminophen (TYLENOL) 500 MG tablet     levothyroxine (SYNTHROID/LEVOTHROID) 50 MCG tablet     sodium chloride  (OCEAN) 0.65 % nasal spray     No current facility-administered medications for this visit.        Family History:  No family history on file.    Social History:  Social History     Tobacco Use     Smoking status: Never Smoker     Smokeless tobacco: Never Used   Substance Use Topics     Alcohol use: Yes     Frequency: 2-4 times a month     Drinks per session: 5 or 6   [employer], Lives with wife and 3 kids (8,5,4)    ROS:  Full review of systems taken with the help of the intake sheet. Otherwise a complete 14 point review of systems was taken and is negative unless stated in the history above.    Physical Exam:   Vitals: /74   Pulse 73   Wt 88 kg (193 lb 14.4 oz)   BMI 26.30 kg/m     BMI= Body mass index is 26.3 kg/m .   General: well appearing, no acute distress, pleasant and conversant,   Mental Status/neuro: alert and oriented  Face: symmetrical, normal facial color  Visions:  Extraocular movements are normal. There is no ptosisEyes: anicteric, PERRL, no proptosis or lid lag  Neck: suppler, no lymphadenopahty  Thyroid: normal size and texture, no nodule palpable, no bruits  Heart: regular rhythm, S1S2, no murmur appreciated  Chest: no gynecomastia  Lung: clear to auscultation bilaterally  Abdomen: soft, NT/ND, no hepatomegaly  Testicular size: 25 ml bilateral  Legs: no swelling or edema        Labs : I reviewed data from epic and extract and summarize the pertinent data here.      1/9/2020 12:16   Sodium 139   Potassium 4.2   Chloride 108   Carbon Dioxide 28   Urea Nitrogen 18   Creatinine 0.96   GFR Estimate >90   GFR Estimate If Black >90   Calcium 9.3   Anion Gap 3   Adrenal Corticotropin 13   Alpha Subunit Pituitary Tumor Marker 0.2   Cortisol Serum 6.4   FSH 3.0   Growth Hormone 0.2   Prolactin 6   T4 Free 0.87   Testosterone Total 241   TSH 0.57   Glucose 76   Ins Growth Factor 1 42 (L)   Lutropin 1.7     Lab Results   Component Value Date     01/09/2020      Lab Results   Component  Value Date    POTASSIUM 4.2 01/09/2020     Lab Results   Component Value Date    CHLORIDE 108 01/09/2020     Lab Results   Component Value Date    MARK 9.3 01/09/2020     Lab Results   Component Value Date    CO2 28 01/09/2020     Lab Results   Component Value Date    BUN 18 01/09/2020     Lab Results   Component Value Date    CR 0.96 01/09/2020     Lab Results   Component Value Date    GLC 76 01/09/2020     Lab Results   Component Value Date    TSH 0.57 01/09/2020     Lab Results   Component Value Date    T4 0.87 01/09/2020     Lab Results   Component Value Date    LH 1.7 01/09/2020     Lab Results   Component Value Date    FSH 3.0 01/09/2020     Lab Results   Component Value Date    PROLACTIN 6 01/09/2020           MRI Brain: I personally reviewed the original images and agree with the below reports.   Results for orders placed in visit on 01/09/20   MRI Brain with & without gadolinium with pituitary protocol [[id number]]    Narrative Brain/ Pituitary MRI without and with contrast    History: Pituitary resection, benign neoplasm of pituitary gland and  craniopharyngeal duct.    Comparison:  10/23/2019 preoperative pituitary MRI.    Technique: Axial diffusion and FLAIR images of the whole brain  obtained without intravenous contrast. Sagittal T1 and T2-weighted,  coronal T2-weighted, coronal T1-weighted images with focus on the  sella were obtained without intravenous contrast. Post intravenous  contrast using gadolinium coronal and sagittal T1-weighted images were  obtained focused on the sella. Dynamic postcontrast coronal  T1-weighted images were also obtained.    Contrast: 10 mL Gadovist.    Findings:     Postoperative changes of the transnasal endoscopic excision of  pituitary macroadenoma. No residual mass is noted. Pituitary stalk is  deviated to the left.    The optic chiasm appears intact and not displaced.    The major cavernous carotid vascular flow-voids appear patent.      FLAIR images through the whole  brain are unremarkable, and demonstrate  no mass effect, midline shift, or significant enlargement of the  ventricles.      Impression Impression: No evidence of residual mass within the sella.    I have personally reviewed the examination and initial interpretation  and I agree with the findings.    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 Deluna.     # Pituitary axis    # Mild secondary hypothryodism  Currently on LT4 50 mcg, will continue for now  - TSH, free T4 today    # Weakness muscle body pain  May due to low IGF1 (42)  - repeat IGF1 today then middle of April    # Gonado axis    - LH, FSH, total testosterone today  - 1 month ago testosterone was lower side 240.    # HPA axis    - cortisol and ACTH     RTC with me in later April 2020.       I spent 60 minutes with this patient face to face and explained the conditions and plans (more than 50% of time was counseling/coordination of care) . The patient understood and is satisfied with today's visit. Return to clinic with me in 2 months.         Leanna Gomez MD  Staff Physician  Endocrinology and Metabolism  License: [id number]

## 2020-02-12 NOTE — NURSING NOTE
Chief Complaint   Patient presents with     New Patient     Endocrine      Juan Daniel Saeed CMA

## 2020-02-12 NOTE — PROGRESS NOTES
- Endocrinology Initial Consultation -    Reason for visit/consult:  Benign neoplasm of pituitary gland and craniopharyngeal duct (pouch) (H)    Primary care provider: Antony Cullen    HPI: A 38 yo male here with post pituitary apoplexy follow up. Referral from Dr. Deluna.   Patient had sudden onset of HA and right side vision disturbance, and he went to ER and found out pituitary tumor. He went to Neurologist office next day then he was recommended Dr. Deluna and next day patient went to [facility] (11/01/2019). Perioperative no complications, his right double vision persisted until early 12/2019 and currently resolved.     Back to work. 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     Appetite: good  Sleep: good wakes up 1-2 times a night for bathroom  No polyuria no polydipsia  Energy level: OK but not high (60% to prior to surgery)  Libido: low for 2 -3 years  Erectile function: ok  Muscle strength: feel weak  General body pain  Joint pain: yes        Past Medical/Surgical History:  No past medical history on file.  Past Surgical History:   Procedure Laterality Date     OPTICAL TRACKING SYSTEM ENDOSCOPIC RESECTION TUMOR CRANIAL N/A 11/1/2019    Procedure: Endoscopic transnasal resection of tumor;  Surgeon: Steve Deluna MD;  Location:  OR       Allergies:  No Known Allergies    Current Medications   Current Outpatient Medications   Medication     levothyroxine (SYNTHROID/LEVOTHROID) 50 MCG tablet     acetaminophen (TYLENOL) 500 MG tablet     levothyroxine (SYNTHROID/LEVOTHROID) 50 MCG tablet     sodium chloride (OCEAN) 0.65 % nasal spray     No current facility-administered medications for this visit.        Family History:  No family history on file.    Social History:  Social History     Tobacco Use     Smoking status: Never Smoker     Smokeless tobacco: Never Used   Substance Use Topics     Alcohol  use: Yes     Frequency: 2-4 times a month     Drinks per session: 5 or 6   [employer] company, Lives with wife and 3 kids (8,5,4)    ROS:  Full review of systems taken with the help of the intake sheet. Otherwise a complete 14 point review of systems was taken and is negative unless stated in the history above.    Physical Exam:   Vitals: /74   Pulse 73   Wt 88 kg (193 lb 14.4 oz)   BMI 26.30 kg/m    BMI= Body mass index is 26.3 kg/m .   General: well appearing, no acute distress, pleasant and conversant,   Mental Status/neuro: alert and oriented  Face: symmetrical, normal facial color  Visions:  Extraocular movements are normal. There is no ptosisEyes: anicteric, PERRL, no proptosis or lid lag  Neck: suppler, no lymphadenopahty  Thyroid: normal size and texture, no nodule palpable, no bruits  Heart: regular rhythm, S1S2, no murmur appreciated  Chest: no gynecomastia  Lung: clear to auscultation bilaterally  Abdomen: soft, NT/ND, no hepatomegaly  Testicular size: 25 ml bilateral  Legs: no swelling or edema        Labs : I reviewed data from epic and extract and summarize the pertinent data here.      1/9/2020 12:16   Sodium 139   Potassium 4.2   Chloride 108   Carbon Dioxide 28   Urea Nitrogen 18   Creatinine 0.96   GFR Estimate >90   GFR Estimate If Black >90   Calcium 9.3   Anion Gap 3   Adrenal Corticotropin 13   Alpha Subunit Pituitary Tumor Marker 0.2   Cortisol Serum 6.4   FSH 3.0   Growth Hormone 0.2   Prolactin 6   T4 Free 0.87   Testosterone Total 241   TSH 0.57   Glucose 76   Ins Growth Factor 1 42 (L)   Lutropin 1.7     Lab Results   Component Value Date     01/09/2020      Lab Results   Component Value Date    POTASSIUM 4.2 01/09/2020     Lab Results   Component Value Date    CHLORIDE 108 01/09/2020     Lab Results   Component Value Date    MARK 9.3 01/09/2020     Lab Results   Component Value Date    CO2 28 01/09/2020     Lab Results   Component Value Date    BUN 18 01/09/2020     Lab  Results   Component Value Date    CR 0.96 01/09/2020     Lab Results   Component Value Date    GLC 76 01/09/2020     Lab Results   Component Value Date    TSH 0.57 01/09/2020     Lab Results   Component Value Date    T4 0.87 01/09/2020     Lab Results   Component Value Date    LH 1.7 01/09/2020     Lab Results   Component Value Date    FSH 3.0 01/09/2020     Lab Results   Component Value Date    PROLACTIN 6 01/09/2020           MRI Brain: I personally reviewed the original images and agree with the below reports.   Results for orders placed in visit on 01/09/20   MRI Brain with & without gadolinium with pituitary protocol [OLN601]    Narrative Brain/ Pituitary MRI without and with contrast    History: Pituitary resection, benign neoplasm of pituitary gland and  craniopharyngeal duct.    Comparison:  10/23/2019 preoperative pituitary MRI.    Technique: Axial diffusion and FLAIR images of the whole brain  obtained without intravenous contrast. Sagittal T1 and T2-weighted,  coronal T2-weighted, coronal T1-weighted images with focus on the  sella were obtained without intravenous contrast. Post intravenous  contrast using gadolinium coronal and sagittal T1-weighted images were  obtained focused on the sella. Dynamic postcontrast coronal  T1-weighted images were also obtained.    Contrast: 10 mL Gadovist.    Findings:     Postoperative changes of the transnasal endoscopic excision of  pituitary macroadenoma. No residual mass is noted. Pituitary stalk is  deviated to the left.    The optic chiasm appears intact and not displaced.    The major cavernous carotid vascular flow-voids appear patent.      FLAIR images through the whole brain are unremarkable, and demonstrate  no mass effect, midline shift, or significant enlargement of the  ventricles.      Impression Impression: No evidence of residual mass within the sella.    I have personally reviewed the examination and initial interpretation  and I agree with the  findings.    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 Deluna.     # Pituitary axis    # Mild secondary hypothryodism  Currently on LT4 50 mcg, will continue for now  - TSH, free T4 today    # Weakness muscle body pain  May due to low IGF1 (42)  - repeat IGF1 today then middle of April    # Gonado axis    - LH, FSH, total testosterone today  - 1 month ago testosterone was lower side 240.    # HPA axis    - cortisol and ACTH     RTC with me in later April 2020.       Addendum    Testosterone reduced more, will start testosterone supplement. (will ask preference gel vs injection). Also increase levothyroxine from 50 to 88 mcg.    2/12/2020 14:29   Cortisol Serum 4.4   FSH 3.0   T4 Free 0.67 (L)   Testosterone Total 149 (L)   TSH 0.89   Glucose 94       I spent 60 minutes with this patient face to face and explained the conditions and plans (more than 50% of time was counseling/coordination of care) . The patient understood and is satisfied with today's visit. Return to clinic with me in 2 months.         Leanna Gomez MD  Staff Physician  Endocrinology and Metabolism  License: [id number]

## 2020-02-13 LAB — ACTH PLAS-MCNC: 19 PG/ML

## 2020-02-14 LAB
IGF-I BLD-MCNC: 116 NG/ML (ref 83–238)
TESTOST SERPL-MCNC: 149 NG/DL (ref 240–950)

## 2020-02-16 RX ORDER — TESTOSTERONE 1.62 MG/G
2 GEL TRANSDERMAL DAILY
Qty: 75 G | Refills: 5 | Status: SHIPPED | OUTPATIENT
Start: 2020-02-16 | End: 2020-08-25

## 2020-02-16 RX ORDER — LEVOTHYROXINE SODIUM 88 UG/1
88 TABLET ORAL DAILY
Qty: 90 TABLET | Refills: 3 | Status: SHIPPED | OUTPATIENT
Start: 2020-02-16 | End: 2021-03-01

## 2020-02-18 ENCOUNTER — TELEPHONE (OUTPATIENT)
Dept: ENDOCRINOLOGY | Facility: CLINIC | Age: 40
End: 2020-02-18

## 2020-02-18 NOTE — TELEPHONE ENCOUNTER
[facility] Call Center    Phone Message    May a detailed message be left on voicemail: yes     Reason for Call: Other: Ld returning Kiara's call.  He is available anytime for a call back at your earliest convenience     Action Taken: Message routed to:  [facility] ([facility] SANTY Haskins    Travel Screening: Not Applicable

## 2020-02-18 NOTE — TELEPHONE ENCOUNTER
Message left for Luisito to check my chart. I also sent results letter  With plan to his home address. Kiara Luis RN on 2/18/2020 at 3:18 PM

## 2020-02-18 NOTE — TELEPHONE ENCOUNTER
Luisito wanted to discuss the other routes of testosterone. He did  the Androgel  already . He will start  With the gel and if wants to change he can do  that at a later time . Follow up with Dr Gomez is April .Kiara Luis RN on 2/18/2020 at 4:14 PM

## 2020-02-18 NOTE — TELEPHONE ENCOUNTER
----- Message from Leanna Gomez MD sent at 2/16/2020  3:25 PM CST -----  coud you communicate?  1. Thryoid: levethyroxine increase from 50 to 88 mcg  2. Testosterone getting lower this time, we will start testosterone.   I prescribed gel, to see how it goes (seems need PA). And please ask his preference gel vs injection.

## 2020-03-06 ENCOUNTER — MYC MEDICAL ADVICE (OUTPATIENT)
Dept: ENDOCRINOLOGY | Facility: CLINIC | Age: 40
End: 2020-03-06

## 2020-03-06 DIAGNOSIS — D49.7 PITUITARY TUMOR: Primary | ICD-10-CM

## 2020-03-06 RX ORDER — TESTOSTERONE 20.25 MG/1.25G
40.5 GEL TOPICAL DAILY
Qty: 60 PACKET | Refills: 1 | Status: SHIPPED | OUTPATIENT
Start: 2020-03-06

## 2020-03-11 ENCOUNTER — HEALTH MAINTENANCE LETTER (OUTPATIENT)
Age: 40
End: 2020-03-11

## 2020-07-20 PROBLEM — D49.7 PITUITARY TUMOR: Status: ACTIVE | Noted: 2019-10-23

## 2020-08-25 DIAGNOSIS — E29.1 HYPOGONADISM MALE: ICD-10-CM

## 2020-08-25 NOTE — TELEPHONE ENCOUNTER
TESTOSTERONE 1.62% GEL PUMP   Last Written Prescription Date:  3/6/2020  Last Fill Quantity: 60,   # refills: 1  Last Office Visit : 8/24/2020  Future Office visit:  None    Routing refill request to provider for review/approval because:  No LIPID or PSA Lab on file.      Clarifications of orders:   Is it packets or pumps for the gel.     Refills for this classification require provider review     Selena Rogel RN  Central Triage Red Flags/Med Refills

## 2020-08-26 RX ORDER — TESTOSTERONE 1.62 MG/G
2 GEL TRANSDERMAL DAILY
Qty: 75 G | Refills: 5 | Status: SHIPPED | OUTPATIENT
Start: 2020-08-26 | End: 2021-02-24

## 2020-08-27 ENCOUNTER — TELEPHONE (OUTPATIENT)
Dept: ENDOCRINOLOGY | Facility: CLINIC | Age: 40
End: 2020-08-27

## 2020-08-27 PROBLEM — E29.1 HYPOGONADISM MALE: Status: ACTIVE | Noted: 2020-08-27

## 2021-01-03 ENCOUNTER — HEALTH MAINTENANCE LETTER (OUTPATIENT)
Age: 41
End: 2021-01-03

## 2021-01-26 ENCOUNTER — MYC MEDICAL ADVICE (OUTPATIENT)
Dept: ENDOCRINOLOGY | Facility: CLINIC | Age: 41
End: 2021-01-26

## 2021-01-26 DIAGNOSIS — E23.0 HYPOPITUITARISM (H): Primary | ICD-10-CM

## 2021-02-18 DIAGNOSIS — E23.0 HYPOPITUITARISM (H): ICD-10-CM

## 2021-02-18 LAB
CORTIS SERPL-MCNC: 7.9 UG/DL (ref 4–22)
T4 FREE SERPL-MCNC: 0.84 NG/DL (ref 0.76–1.46)
TSH SERPL DL<=0.005 MIU/L-ACNC: 0.75 MU/L (ref 0.4–4)

## 2021-02-18 PROCEDURE — 99000 SPECIMEN HANDLING OFFICE-LAB: CPT | Performed by: INTERNAL MEDICINE

## 2021-02-18 PROCEDURE — 84443 ASSAY THYROID STIM HORMONE: CPT | Performed by: INTERNAL MEDICINE

## 2021-02-18 PROCEDURE — 84403 ASSAY OF TOTAL TESTOSTERONE: CPT | Mod: 90 | Performed by: INTERNAL MEDICINE

## 2021-02-18 PROCEDURE — 84305 ASSAY OF SOMATOMEDIN: CPT | Performed by: INTERNAL MEDICINE

## 2021-02-18 PROCEDURE — 82533 TOTAL CORTISOL: CPT | Performed by: INTERNAL MEDICINE

## 2021-02-18 PROCEDURE — 36415 COLL VENOUS BLD VENIPUNCTURE: CPT | Performed by: INTERNAL MEDICINE

## 2021-02-18 PROCEDURE — 84439 ASSAY OF FREE THYROXINE: CPT | Performed by: INTERNAL MEDICINE

## 2021-02-18 PROCEDURE — 82024 ASSAY OF ACTH: CPT | Performed by: INTERNAL MEDICINE

## 2021-02-19 LAB
ACTH PLAS-MCNC: 20 PG/ML
IGF-I BLD-MCNC: 89 NG/ML (ref 82–237)

## 2021-02-22 LAB — TESTOST SERPL-MCNC: 383 NG/DL (ref 240–950)

## 2021-02-23 DIAGNOSIS — E29.1 HYPOGONADISM MALE: ICD-10-CM

## 2021-02-24 RX ORDER — TESTOSTERONE 1.62 MG/G
2 GEL TRANSDERMAL DAILY
Qty: 75 G | Refills: 5 | Status: SHIPPED | OUTPATIENT
Start: 2021-02-24 | End: 2021-03-03

## 2021-02-24 NOTE — TELEPHONE ENCOUNTER
testosterone (ANDROGEL 1.62 % PUMP) 20.25 MG/ACT gel      Last Written Prescription Date:  8/26/2020  Last Fill Quantity: 75 g,   # refills: 5  Last Office Visit : 8/24/2020  Future Office visit:  *had today appointment- cancelled by clinician    Routing refill request to provider for review/approval because:  Controlled medication.

## 2021-02-26 DIAGNOSIS — E03.8 SECONDARY HYPOTHYROIDISM: ICD-10-CM

## 2021-03-01 RX ORDER — LEVOTHYROXINE SODIUM 88 UG/1
88 TABLET ORAL DAILY
Qty: 90 TABLET | Refills: 3 | Status: SHIPPED | OUTPATIENT
Start: 2021-03-01 | End: 2021-03-03

## 2021-03-01 NOTE — TELEPHONE ENCOUNTER
Last Clinic Visit: 2/24/2021  [facility] [place]    TSH   Date Value Ref Range Status   02/18/2021 0.75 0.40 - 4.00 mU/L Final   \

## 2021-03-02 NOTE — PROGRESS NOTES
pituitary tumor  Coleen Lopez CMA    Ld is a 40 year old who is being evaluated via a billable video visit.      How would you like to obtain your AVS? MyChart  If the video visit is dropped, the invitation should be resent by: Send to e-mail at: [email or web address]  Will anyone else be joining your video visit? No       Video start 4:00 pm  End: 4:30 pm  Total care time 45 minute including reviewing most recent and past MRI over 2 years.                                                                                - Endocrinology Follow up -    Reason for visit/consult:  Benign neoplasm of pituitary gland and craniopharyngeal duct (pouch) (H)    Primary care provider: Antony Cullen      Assessment and Plan  A 40 year old male with post pituitary macroadenoma apoplexy,       # Pituitary tumor / apoplexy    No obvious residual tumor, next MRI in early 2022    # Pituitary axis- hypopituitarism    # Mild secondary hypothryodism  Free T4 lower normal, will increase LT4 100 mcg daily    - TSH, free T4 in 3 month     # Weakness muscle body pain   low IGF1 42 to increased 86 in 2/2021.     # Gonado axis  Testosterone 383 in 2/2021, improved but given his fatigue, not good muscle strength, we will slight increase dose    - increase dose of tesosterone gel from 2 pump to 3 pumps    # HPA axis  Seems intact    RTC with me in 1 year      Leanna Gomez MD  Staff Physician  Endocrinology and Metabolism  License: [id number]    Interval History as of 3/3/2021 : Patient has been doing well. Fair energy level. Last seen 1 year ago . Medication compliance excellent  . New event includes  .  HPI: A 40 yo male here with post pituitary apoplexy follow up. Referral from Dr. Deluna.   Patient had sudden onset of HA and right side vision disturbance, and he went to ER and found out pituitary tumor. He went to Neurologist office next day then he was recommended Dr. Deluna and next day patient went to [facility] (11/01/2019). Perioperative  no complications, his right double vision persisted until early 12/2019 and currently resolved.     Back to work. 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     Appetite: good  Sleep: good wakes up 1-2 times a night for bathroom  No polyuria no polydipsia  Energy level: OK but not high (60% to prior to surgery)  Libido: low for 2 -3 years  Erectile function: ok  Muscle strength: feel weak  General body pain  Joint pain: yes        Past Medical/Surgical History:  No past medical history on file.  Past Surgical History:   Procedure Laterality Date     OPTICAL TRACKING SYSTEM ENDOSCOPIC RESECTION TUMOR CRANIAL N/A 11/1/2019    Procedure: Endoscopic transnasal resection of tumor;  Surgeon: Steve Deluna MD;  Location:  OR       Allergies:  No Known Allergies    Current Medications   Current Outpatient Medications   Medication     acetaminophen (TYLENOL) 500 MG tablet     levothyroxine (SYNTHROID/LEVOTHROID) 88 MCG tablet     testosterone (ANDROGEL 1.62 % PUMP) 20.25 MG/ACT gel     Testosterone 20.25 MG/1.25GM (1.62%) GEL     sodium chloride (OCEAN) 0.65 % nasal spray     No current facility-administered medications for this visit.        Family History:  No family history on file.    Social History:  Social History     Tobacco Use     Smoking status: Never Smoker     Smokeless tobacco: Never Used   Substance Use Topics     Alcohol use: Yes     Frequency: 2-4 times a month     Drinks per session: 5 or 6   [employer], Lives with wife and 3 kids (8,5,4)    ROS:  Full review of systems taken with the help of the intake sheet. Otherwise a complete 14 point review of systems was taken and is negative unless stated in the history above.    Physical Exam:   Vitals: There were no vitals taken for this visit.  BMI= There is no height or weight on file to calculate BMI.   General: well appearing, no acute distress, pleasant and conversant,   Mental Status/neuro: alert and  oriented  Face: symmetrical, normal facial color  Visions:  Extraocular movements are normal. There is no ptosis  Eyes: anicteric, no proptosis or lid lag  Resp: no acute distress        Labs : I reviewed data from epic and extract and summarize the pertinent data here.      1/9/2020 12:16   Sodium 139   Potassium 4.2   Chloride 108   Carbon Dioxide 28   Urea Nitrogen 18   Creatinine 0.96   GFR Estimate >90   GFR Estimate If Black >90   Calcium 9.3   Anion Gap 3   Adrenal Corticotropin 13   Alpha Subunit Pituitary Tumor Marker 0.2   Cortisol Serum 6.4   FSH 3.0   Growth Hormone 0.2   Prolactin 6   T4 Free 0.87   Testosterone Total 241   TSH 0.57   Glucose 76   Ins Growth Factor 1 42 (L)   Lutropin 1.7     Lab Results   Component Value Date     01/09/2020      Lab Results   Component Value Date    POTASSIUM 4.2 01/09/2020     Lab Results   Component Value Date    CHLORIDE 108 01/09/2020     Lab Results   Component Value Date    MARK 9.3 01/09/2020     Lab Results   Component Value Date    CO2 28 01/09/2020     Lab Results   Component Value Date    BUN 18 01/09/2020     Lab Results   Component Value Date    CR 0.96 01/09/2020     Lab Results   Component Value Date    GLC 76 01/09/2020     Lab Results   Component Value Date    TSH 0.57 01/09/2020     Lab Results   Component Value Date    T4 0.87 01/09/2020     Lab Results   Component Value Date    LH 1.7 01/09/2020     Lab Results   Component Value Date    FSH 3.0 01/09/2020     Lab Results   Component Value Date    PROLACTIN 6 01/09/2020           MRI Brain: I personally reviewed the original images and agree with the below reports.   Results for orders placed in visit on 01/09/20   MRI Brain with & without gadolinium with pituitary protocol [[id number]]    Narrative Brain/ Pituitary MRI without and with contrast    History: Pituitary resection, benign neoplasm of pituitary gland and  craniopharyngeal duct.    Comparison:  10/23/2019 preoperative pituitary  MRI.    Technique: Axial diffusion and FLAIR images of the whole brain  obtained without intravenous contrast. Sagittal T1 and T2-weighted,  coronal T2-weighted, coronal T1-weighted images with focus on the  sella were obtained without intravenous contrast. Post intravenous  contrast using gadolinium coronal and sagittal T1-weighted images were  obtained focused on the sella. Dynamic postcontrast coronal  T1-weighted images were also obtained.    Contrast: 10 mL Gadovist.    Findings:     Postoperative changes of the transnasal endoscopic excision of  pituitary macroadenoma. No residual mass is noted. Pituitary stalk is  deviated to the left.    The optic chiasm appears intact and not displaced.    The major cavernous carotid vascular flow-voids appear patent.      FLAIR images through the whole brain are unremarkable, and demonstrate  no mass effect, midline shift, or significant enlargement of the  ventricles.      Impression Impression: No evidence of residual mass within the sella.    I have personally reviewed the examination and initial interpretation  and I agree with the findings.    AMRIT LOPEZ MD

## 2021-03-03 ENCOUNTER — VIRTUAL VISIT (OUTPATIENT)
Dept: ENDOCRINOLOGY | Facility: CLINIC | Age: 41
End: 2021-03-03
Payer: COMMERCIAL

## 2021-03-03 DIAGNOSIS — E29.1 HYPOGONADISM MALE: ICD-10-CM

## 2021-03-03 DIAGNOSIS — E23.0 HYPOPITUITARISM (H): Primary | ICD-10-CM

## 2021-03-03 DIAGNOSIS — D35.2 PITUITARY ADENOMA (H): ICD-10-CM

## 2021-03-03 PROCEDURE — 99215 OFFICE O/P EST HI 40 MIN: CPT | Mod: GT | Performed by: INTERNAL MEDICINE

## 2021-03-03 RX ORDER — TESTOSTERONE 1.62 MG/G
3 GEL TRANSDERMAL DAILY
Qty: 75 G | Refills: 5 | Status: SHIPPED | OUTPATIENT
Start: 2021-03-03 | End: 2021-04-02

## 2021-03-03 RX ORDER — LEVOTHYROXINE SODIUM 100 UG/1
100 TABLET ORAL DAILY
Qty: 90 TABLET | Refills: 3 | Status: SHIPPED | OUTPATIENT
Start: 2021-03-03 | End: 2022-03-29

## 2021-03-03 NOTE — LETTER
3/3/2021       RE: Luisito Asher  [address] [facility] [address]     Dear Colleague,    Thank you for referring your patient, Luisito Asher, to the [facility] [place] at [facility]. Please see a copy of my visit note below.    pituitary tumor  LENA Rogers is a 40 year old who is being evaluated via a billable video visit.      How would you like to obtain your AVS? MyChart  If the video visit is dropped, the invitation should be resent by: Send to e-mail at: [email or web address]  Will anyone else be joining your video visit? No       Video start 4:00 pm  End: 4:30 pm  Total care time 45 minute including reviewing most recent and past MRI over 2 years.                                                                                - Endocrinology Follow up -    Reason for visit/consult:  Benign neoplasm of pituitary gland and craniopharyngeal duct (pouch) (H)    Primary care provider: Antony Cullen      Assessment and Plan  A 40 year old male with post pituitary macroadenoma apoplexy,       # Pituitary tumor / apoplexy    No obvious residual tumor, next MRI in early 2022    # Pituitary axis- hypopituitarism    # Mild secondary hypothryodism  Free T4 lower normal, will increase LT4 100 mcg daily    - TSH, free T4 in 3 month     # Weakness muscle body pain   low IGF1 42 to increased 86 in 2/2021.     # Gonado axis  Testosterone 383 in 2/2021, improved but given his fatigue, not good muscle strength, we will slight increase dose    - increase dose of tesosterone gel from 2 pump to 3 pumps    # HPA axis  Seems intact    RTC with me in 1 year      Leanna Gomez MD  Staff Physician  Endocrinology and Metabolism  License: [id number]    Interval History as of 3/3/2021 : Patient has been doing well. Fair energy level. Last seen 1 year ago . Medication compliance excellent  . New event includes  .  HPI: A 38 yo male here with  post pituitary apoplexy follow up. Referral from Dr. Deluna.   Patient had sudden onset of HA and right side vision disturbance, and he went to ER and found out pituitary tumor. He went to Neurologist office next day then he was recommended Dr. Deluna and next day patient went to [facility] (11/01/2019). Perioperative no complications, his right double vision persisted until early 12/2019 and currently resolved.     Back to work. 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     Appetite: good  Sleep: good wakes up 1-2 times a night for bathroom  No polyuria no polydipsia  Energy level: OK but not high (60% to prior to surgery)  Libido: low for 2 -3 years  Erectile function: ok  Muscle strength: feel weak  General body pain  Joint pain: yes        Past Medical/Surgical History:  No past medical history on file.  Past Surgical History:   Procedure Laterality Date     OPTICAL TRACKING SYSTEM ENDOSCOPIC RESECTION TUMOR CRANIAL N/A 11/1/2019    Procedure: Endoscopic transnasal resection of tumor;  Surgeon: Steve Deluna MD;  Location:  OR       Allergies:  No Known Allergies    Current Medications   Current Outpatient Medications   Medication     acetaminophen (TYLENOL) 500 MG tablet     levothyroxine (SYNTHROID/LEVOTHROID) 88 MCG tablet     testosterone (ANDROGEL 1.62 % PUMP) 20.25 MG/ACT gel     Testosterone 20.25 MG/1.25GM (1.62%) GEL     sodium chloride (OCEAN) 0.65 % nasal spray     No current facility-administered medications for this visit.        Family History:  No family history on file.    Social History:  Social History     Tobacco Use     Smoking status: Never Smoker     Smokeless tobacco: Never Used   Substance Use Topics     Alcohol use: Yes     Frequency: 2-4 times a month     Drinks per session: 5 or 6   [employer], Lives with wife and 3 kids (8,5,4)    ROS:  Full review of systems taken with the help of the intake sheet. Otherwise a complete 14 point review of  systems was taken and is negative unless stated in the history above.    Physical Exam:   Vitals: There were no vitals taken for this visit.  BMI= There is no height or weight on file to calculate BMI.   General: well appearing, no acute distress, pleasant and conversant,   Mental Status/neuro: alert and oriented  Face: symmetrical, normal facial color  Visions:  Extraocular movements are normal. There is no ptosis  Eyes: anicteric, no proptosis or lid lag  Resp: no acute distress        Labs : I reviewed data from epic and extract and summarize the pertinent data here.      1/9/2020 12:16   Sodium 139   Potassium 4.2   Chloride 108   Carbon Dioxide 28   Urea Nitrogen 18   Creatinine 0.96   GFR Estimate >90   GFR Estimate If Black >90   Calcium 9.3   Anion Gap 3   Adrenal Corticotropin 13   Alpha Subunit Pituitary Tumor Marker 0.2   Cortisol Serum 6.4   FSH 3.0   Growth Hormone 0.2   Prolactin 6   T4 Free 0.87   Testosterone Total 241   TSH 0.57   Glucose 76   Ins Growth Factor 1 42 (L)   Lutropin 1.7     Lab Results   Component Value Date     01/09/2020      Lab Results   Component Value Date    POTASSIUM 4.2 01/09/2020     Lab Results   Component Value Date    CHLORIDE 108 01/09/2020     Lab Results   Component Value Date    MARK 9.3 01/09/2020     Lab Results   Component Value Date    CO2 28 01/09/2020     Lab Results   Component Value Date    BUN 18 01/09/2020     Lab Results   Component Value Date    CR 0.96 01/09/2020     Lab Results   Component Value Date    GLC 76 01/09/2020     Lab Results   Component Value Date    TSH 0.57 01/09/2020     Lab Results   Component Value Date    T4 0.87 01/09/2020     Lab Results   Component Value Date    LH 1.7 01/09/2020     Lab Results   Component Value Date    FSH 3.0 01/09/2020     Lab Results   Component Value Date    PROLACTIN 6 01/09/2020           MRI Brain: I personally reviewed the original images and agree with the below reports.   Results for orders placed in  visit on 01/09/20   MRI Brain with & without gadolinium with pituitary protocol [AFP180]    Narrative Brain/ Pituitary MRI without and with contrast    History: Pituitary resection, benign neoplasm of pituitary gland and  craniopharyngeal duct.    Comparison:  10/23/2019 preoperative pituitary MRI.    Technique: Axial diffusion and FLAIR images of the whole brain  obtained without intravenous contrast. Sagittal T1 and T2-weighted,  coronal T2-weighted, coronal T1-weighted images with focus on the  sella were obtained without intravenous contrast. Post intravenous  contrast using gadolinium coronal and sagittal T1-weighted images were  obtained focused on the sella. Dynamic postcontrast coronal  T1-weighted images were also obtained.    Contrast: 10 mL Gadovist.    Findings:     Postoperative changes of the transnasal endoscopic excision of  pituitary macroadenoma. No residual mass is noted. Pituitary stalk is  deviated to the left.    The optic chiasm appears intact and not displaced.    The major cavernous carotid vascular flow-voids appear patent.      FLAIR images through the whole brain are unremarkable, and demonstrate  no mass effect, midline shift, or significant enlargement of the  ventricles.      Impression Impression: No evidence of residual mass within the sella.    I have personally reviewed the examination and initial interpretation  and I agree with the findings.    AMRIT LOPEZ MD

## 2021-04-02 ENCOUNTER — MYC MEDICAL ADVICE (OUTPATIENT)
Dept: ENDOCRINOLOGY | Facility: CLINIC | Age: 41
End: 2021-04-02

## 2021-04-02 DIAGNOSIS — E23.0 HYPOPITUITARISM (H): ICD-10-CM

## 2021-04-02 DIAGNOSIS — E29.1 HYPOGONADISM MALE: ICD-10-CM

## 2021-04-02 RX ORDER — TESTOSTERONE 1.62 MG/G
GEL TRANSDERMAL
Qty: 75 G | Refills: 5 | Status: SHIPPED | OUTPATIENT
Start: 2021-04-02 | End: 2021-10-05

## 2021-04-25 ENCOUNTER — HEALTH MAINTENANCE LETTER (OUTPATIENT)
Age: 41
End: 2021-04-25

## 2021-09-07 ENCOUNTER — TELEPHONE (OUTPATIENT)
Dept: ENDOCRINOLOGY | Facility: CLINIC | Age: 41
End: 2021-09-07

## 2021-09-08 NOTE — TELEPHONE ENCOUNTER
Central Prior Authorization Team   Phone: [phone]      PA Initiation    Medication: testosterone (ANDROGEL 1.62 % PUMP) 20.25 MG/ACT gel-PA initiated  Insurance Company: [employer] ([facility]) - Phone [phone] Fax [phone]  Ph[id number] [place], [place] - [address]  Filling Pharmacy Phone: [phone]  Filling Pharmacy Fax:    Start Date: 9/8/2021

## 2021-09-09 NOTE — TELEPHONE ENCOUNTER
Prior Authorization Approval    Authorization Effective Date: 9/7/2021  Authorization Expiration Date: 9/7/2022  Medication: testosterone (ANDROGEL 1.62 % PUMP) 20.25 MG/ACT gel-PA approved  Approved Dose/Quantity:   Reference #:     Insurance Company: [employer] ([facility]) - Phone [phone] Fax [phone]  Expected CoPay:       CoPay Card Available:      Foundation Assistance Needed:    Which Pharmacy is filling the prescription (Not needed for infusion/clinic administered): [id number], [place] - [address] [place] [address]  Pharmacy Notified: Yes  Patient Notified: No

## 2021-10-05 DIAGNOSIS — E29.1 HYPOGONADISM MALE: ICD-10-CM

## 2021-10-05 DIAGNOSIS — E23.0 HYPOPITUITARISM (H): ICD-10-CM

## 2021-10-05 RX ORDER — TESTOSTERONE 1.62 MG/G
GEL TRANSDERMAL
Qty: 75 G | Refills: 5 | Status: SHIPPED | OUTPATIENT
Start: 2021-10-05 | End: 2022-03-11

## 2021-10-05 NOTE — TELEPHONE ENCOUNTER
testosterone (ANDROGEL 1.62 % PUMP) 20.25 MG/ACT gel  Last Written Prescription Date:  4/2/21  Last Fill Quantity: 75g,   # refills: 5  Last Office Visit : 3/3/21  Jason  Future Office visit:  None      Routing refill request to provider for review/approval because: controlled

## 2021-10-10 ENCOUNTER — HEALTH MAINTENANCE LETTER (OUTPATIENT)
Age: 41
End: 2021-10-10

## 2022-03-09 DIAGNOSIS — E29.1 HYPOGONADISM MALE: ICD-10-CM

## 2022-03-09 DIAGNOSIS — E23.0 HYPOPITUITARISM (H): ICD-10-CM

## 2022-03-11 RX ORDER — TESTOSTERONE 1.62 MG/G
GEL TRANSDERMAL
Qty: 75 G | Refills: 5 | Status: SHIPPED | OUTPATIENT
Start: 2022-03-11 | End: 2022-08-22

## 2022-03-11 NOTE — TELEPHONE ENCOUNTER
TESTOSTERONE 1.62% GEL PUMP  Last Written Prescription Date:  10/5/2021  Last Fill Quantity: 75,   # refills: 5  Last Office Visit :  3/3/2021  Future Office visit:  None    Routing refill request to provider for review/approval because:  Drug not on the [facility], [facility] or [facility] refill protocol or controlled substance      Selena Rogel RN  Central Triage Red Flags/Med Refills

## 2022-03-26 DIAGNOSIS — E23.0 HYPOPITUITARISM (H): ICD-10-CM

## 2022-03-29 RX ORDER — LEVOTHYROXINE SODIUM 100 UG/1
100 TABLET ORAL DAILY
Qty: 90 TABLET | Refills: 3 | Status: SHIPPED | OUTPATIENT
Start: 2022-03-29 | End: 2023-04-14

## 2022-03-29 NOTE — TELEPHONE ENCOUNTER
LEVOTHYROXINE 100 MCG TABLET      Last Written Prescription Date:  3/3/21  Last Fill Quantity: 90,   # refills: 3  Last Office Visit : 3/3/21 recommended 1 year follow up  Future Office visit:  None scheduled    Routing refill request to provider for review/approval because:  Overdue for TSH  Lab Test 21  0843   TSH 0.75     Nothing more recent in care everywhere nor media   future orders in queue

## 2022-03-30 ENCOUNTER — TELEPHONE (OUTPATIENT)
Dept: ENDOCRINOLOGY | Facility: CLINIC | Age: 42
End: 2022-03-30
Payer: COMMERCIAL

## 2022-03-30 NOTE — TELEPHONE ENCOUNTER
Sent MyChart for patient to schedule return endo appt with Dr Gomez for med refill purposes.     Georgina Clancy RN Araki, Takako, MD; [id number] 22 hours ago (1:56 PM)     MG    LEVOTHYROXINE 100 MCG TABLET   Clinic coordinators please contact to schedule 1 year follow up from 3/3/21 with Dr Gomez.  Thank you

## 2022-05-22 ENCOUNTER — HEALTH MAINTENANCE LETTER (OUTPATIENT)
Age: 42
End: 2022-05-22

## 2022-07-28 NOTE — PROGRESS NOTES
[facility] Note  11/4/2019    S: Mr. Jorge is looking good this AM. No acute events overnight. No signs of CSF leak.    O: /75 (BP Location: Right arm)   Pulse 50   Temp 96.5  F (35.8  C) (Axillary)   Resp 16   Ht 1.829 m (6')   Wt 80.8 kg (178 lb 1.4 oz)   SpO2 98%   BMI 24.15 kg/m    Alert and oriented x 3, No acute distress  PERRL. Stable extraocular exam, some oozing with small collection on a nasal sling. Right abduction deficit.   Breathing non-labored, no stridor, no accessory muscle use.    Intake/Output Summary (Last 24 hours) at 11/4/2019 0755  Last data filed at 11/4/2019 0607  Gross per 24 hour   Intake 4351 ml   Output 6200 ml   Net -1849 ml       Na: 138.    A/P: Luisito Asher is a 39 year old male with history of a pituitary macroadenoma causing visual changes. He underwent a transnasal resection on 11/1/2019     - Sinus precautions: sneeze/cough with mouth open, no straining, no nose blowing, no straws, no bending over forward, no heavy lifting, no incentive spirometer, no positive pressure respiratory treatments  - Good bowel regimen prevent straining  - Monitor for CSF leak  - Lentz splints will be removed in 3 weeks at follow-up appointment. No indication for antibiotics while in place.  - Order nasal saline spray on discharge   - All other cares per primary team.  Please feel free to contact ENT on-call with questions or concerns.    This patient and plan will be discussed with staff surgeon, Dr. Samuel.    Stacey Garcia MD  [facility] PGY4   Patient allergic to Levaquin  Will continue on Rocephin for 10 days total

## 2022-08-20 DIAGNOSIS — E23.0 HYPOPITUITARISM (H): ICD-10-CM

## 2022-08-20 DIAGNOSIS — E29.1 HYPOGONADISM MALE: ICD-10-CM

## 2022-08-22 RX ORDER — TESTOSTERONE 1.62 MG/G
GEL TRANSDERMAL
Qty: 75 G | Refills: 5 | Status: SHIPPED | OUTPATIENT
Start: 2022-08-22 | End: 2023-02-11

## 2022-08-22 NOTE — TELEPHONE ENCOUNTER
TESTOSTERONE 1.62% GEL PUMP  Last Written Prescription Date:   3/11/2022  Last Fill Quantity: 75,   # refills: 5  Last Office Visit : 3/3/2021  Future Office visit:  None    Routing refill request to provider for review/approval because:  Drug not on the [facility], [facility] or [facility] refill protocol or controlled substance      Selena Rogel RN  Central Triage Red Flags/Med Refills

## 2022-09-18 ENCOUNTER — HEALTH MAINTENANCE LETTER (OUTPATIENT)
Age: 42
End: 2022-09-18

## 2022-09-23 ENCOUNTER — TELEPHONE (OUTPATIENT)
Dept: ENDOCRINOLOGY | Facility: CLINIC | Age: 42
End: 2022-09-23

## 2022-09-23 ENCOUNTER — TELEPHONE (OUTPATIENT)
Dept: NEUROSURGERY | Facility: CLINIC | Age: 42
End: 2022-09-23

## 2022-09-23 DIAGNOSIS — D49.7 PITUITARY TUMOR: Primary | ICD-10-CM

## 2022-09-23 NOTE — TELEPHONE ENCOUNTER
Writer routed to Neurosurgery Nurse Pool   Attn: Gege Coffey, RNCC for Dr. Deluna   *Please place order for MRI for 2 year follow up, and send message to Neurosurgery Clinic Scheduling to schedule MRI and appointment with Dr. Deluna.     Radha Eaton LPN  Neurosurgery

## 2022-09-23 NOTE — TELEPHONE ENCOUNTER
[facility] Call Center    Phone Message    May a detailed message be left on voicemail: yes     Reason for Call: Other: Pt called and stated that he was told by Dr. Deluna that he needed to get updated imaging done. Writer found an order for a CT but it is . Please call Pt back to schedule once new order is placed.      Action Taken: Message routed to:  [facility] ([facility]    Travel Screening: Not Applicable

## 2022-09-23 NOTE — TELEPHONE ENCOUNTER
Centralized Medication Refill Team note:  testosterone (ANDROGEL 1.62 % PUMP) 20.25 MG/ACT gel   PLACE 3 PUMPS DAILY ONTO CLEAN, DRY INTACT SKIN  Last Written Prescription Date:  8/22/22  Last Fill Quantity: 75 g,   # refills: 5  Last Office Visit : 3/3/21  Future Office visit:  None  [id number] [place] [address]       Spoke with pharmacy staff> Insurance needs prior authorization - last noted 9/9/21x 1 year  Message to patient and forwarded to PA team    >>>   Authorization Effective Date: 9/7/2021  Authorization Expiration Date: 9/7/2022  Medication: testosterone (ANDROGEL 1.62 % PUMP) 20.25 MG/ACT gel-PA approved  Approved Dose/Quantity:   Reference #:     Insurance Company: [employer] ([facility]) - Phone [phone] Fax [phone]  Expected CoPay:       CoPay Card Available:      Foundation Assistance Needed:    Which Pharmacy is filling the prescription (Not needed for infusion/clinic administered): [id number] [place] [address]

## 2022-09-23 NOTE — TELEPHONE ENCOUNTER
[facility] Call Center    Phone Message    May a detailed message be left on voicemail: yes     Reason for Call: Medication Refill Request    Has the patient contacted the pharmacy for the refill? Yes   Name of medication being requested: testosterone (ANDROGEL 1.62 % PUMP) 20.25 MG/ACT gel  Provider who prescribed the medication: Leanna Gomez MD  Pharmacy: [address]  Date medication is needed: As soon as possible. Patient is out.    Action Taken: Other: Endo    Travel Screening: Not Applicable

## 2022-09-26 NOTE — TELEPHONE ENCOUNTER
Good Evening,  Patient calling for status for his Androgel medication.  He was wondering if the PA has been sent over and if we have any further update regarding this.  Please reach out to advise. Thank you.

## 2022-10-27 ENCOUNTER — LAB (OUTPATIENT)
Dept: LAB | Facility: CLINIC | Age: 42
End: 2022-10-27
Payer: COMMERCIAL

## 2022-10-27 ENCOUNTER — NURSE TRIAGE (OUTPATIENT)
Dept: FAMILY MEDICINE | Facility: CLINIC | Age: 42
End: 2022-10-27

## 2022-10-27 DIAGNOSIS — E23.6 PITUITARY APOPLEXY (H): ICD-10-CM

## 2022-10-27 LAB
ALBUMIN SERPL-MCNC: 4.5 G/DL (ref 3.4–5)
ALP SERPL-CCNC: 63 U/L (ref 40–150)
ALT SERPL W P-5'-P-CCNC: 59 U/L (ref 0–70)
ANION GAP SERPL CALCULATED.3IONS-SCNC: 6 MMOL/L (ref 3–14)
AST SERPL W P-5'-P-CCNC: 30 U/L (ref 0–45)
BILIRUB SERPL-MCNC: 0.7 MG/DL (ref 0.2–1.3)
BUN SERPL-MCNC: 17 MG/DL (ref 7–30)
CALCIUM SERPL-MCNC: 9.7 MG/DL (ref 8.5–10.1)
CHLORIDE BLD-SCNC: 107 MMOL/L (ref 94–109)
CO2 SERPL-SCNC: 26 MMOL/L (ref 20–32)
CREAT SERPL-MCNC: 1.15 MG/DL (ref 0.66–1.25)
GFR SERPL CREATININE-BSD FRML MDRD: 81 ML/MIN/1.73M2
GLUCOSE BLD-MCNC: 103 MG/DL (ref 70–99)
POTASSIUM BLD-SCNC: 4.2 MMOL/L (ref 3.4–5.3)
PROT SERPL-MCNC: 7.6 G/DL (ref 6.8–8.8)
SODIUM SERPL-SCNC: 139 MMOL/L (ref 133–144)
T4 FREE SERPL-MCNC: 0.89 NG/DL (ref 0.76–1.46)
TSH SERPL DL<=0.005 MIU/L-ACNC: 0.69 MU/L (ref 0.4–4)

## 2022-10-27 PROCEDURE — 84403 ASSAY OF TOTAL TESTOSTERONE: CPT

## 2022-10-27 PROCEDURE — 84305 ASSAY OF SOMATOMEDIN: CPT

## 2022-10-27 PROCEDURE — 80053 COMPREHEN METABOLIC PANEL: CPT

## 2022-10-27 PROCEDURE — 84443 ASSAY THYROID STIM HORMONE: CPT

## 2022-10-27 PROCEDURE — 36415 COLL VENOUS BLD VENIPUNCTURE: CPT

## 2022-10-27 PROCEDURE — 84439 ASSAY OF FREE THYROXINE: CPT

## 2022-10-27 NOTE — TELEPHONE ENCOUNTER
Patient had blood draw today. He started to fall out off the blood draw chair .     Patient was able to respond to us once we placed him in the lab draw chair.     Vitals are 136-72 . Pulse 65 with respirations  16.   Pulse oximeter was 99 %     He is orientated times three.     He drank apple juice, granola bar. He states he feels fine.     Second set of vitals was 120/76. Pulse 60. Respirations  14.   O2 sat 98%    Patient states he feels fine.     He will work from home today, drink fluids.     Mckayla Matthews RN  -[facility]

## 2022-10-28 LAB — IGF-I BLD-MCNC: 80 NG/ML (ref 80–235)

## 2022-11-03 LAB — TESTOST SERPL-MCNC: 572 NG/DL (ref 240–950)

## 2022-11-23 ENCOUNTER — ANCILLARY PROCEDURE (OUTPATIENT)
Dept: MRI IMAGING | Facility: CLINIC | Age: 42
End: 2022-11-23
Attending: NEUROLOGICAL SURGERY
Payer: COMMERCIAL

## 2022-11-23 DIAGNOSIS — D49.7 PITUITARY TUMOR: ICD-10-CM

## 2022-11-23 PROCEDURE — 255N000002 HC RX 255 OP 636: Performed by: NEUROLOGICAL SURGERY

## 2022-11-23 PROCEDURE — 70553 MRI BRAIN STEM W/O & W/DYE: CPT

## 2022-11-23 PROCEDURE — A9585 GADOBUTROL INJECTION: HCPCS | Performed by: NEUROLOGICAL SURGERY

## 2022-11-23 RX ORDER — GADOBUTROL 604.72 MG/ML
9 INJECTION INTRAVENOUS ONCE
Status: COMPLETED | OUTPATIENT
Start: 2022-11-23 | End: 2022-11-23

## 2022-11-23 RX ADMIN — GADOBUTROL 9 ML: 604.72 INJECTION INTRAVENOUS at 09:23

## 2022-12-06 ENCOUNTER — VIRTUAL VISIT (OUTPATIENT)
Dept: NEUROSURGERY | Facility: CLINIC | Age: 42
End: 2022-12-06
Payer: COMMERCIAL

## 2022-12-06 DIAGNOSIS — D35.2 PITUITARY MACROADENOMA (H): Primary | ICD-10-CM

## 2022-12-06 PROCEDURE — 99213 OFFICE O/P EST LOW 20 MIN: CPT | Mod: GT | Performed by: NEUROLOGICAL SURGERY

## 2022-12-06 NOTE — LETTER
December 6, 2022       TO: Luisito Asher  [address] [place] [address]       DearMr.Lb,    We are writing to inform you of your test results.    {[facility] results letter list:464531}    No results found from the In Basket message.    ***

## 2022-12-06 NOTE — NURSING NOTE
Chief Complaint   Patient presents with     Video Visit     2 year Follow-up / pituitary tumor / mri results

## 2022-12-06 NOTE — LETTER
12/6/2022       RE: Luisito Asher  [address]  [place] [address]     Dear Colleague,    Thank you for referring your patient, Luisito Asher, to the [facility]. Please see a copy of my visit note below.    Ld is a 42 year old who is being evaluated via a billable video visit.      How would you like to obtain your AVS? MyChart  If the video visit is dropped, the invitation should be resent by: Text to cell phone: [phone]  Will anyone else be joining your video visit? No        Feels well, energy levels are good. On thyroid and testosterone. Sees Dr. Gomez. Vision is normal.  See dictated note.        Again, thank you for allowing me to participate in the care of your patient.      Sincerely,    Steve Deluna MD

## 2022-12-06 NOTE — PROGRESS NOTES
Ld is a 42 year old who is being evaluated via a billable video visit.      How would you like to obtain your AVS? [employer]  If the video visit is dropped, the invitation should be resent by: Text to cell phone: [phone]  Will anyone else be joining your video visit? No        Video-Visit Details    Video Start Time: 1038    Type of service:  Video Visit    Video End Time:1048    Originating Location (pt. Location): Other work        Distant Location (provider location):  On-site    Platform used for Video Visit: [employer]Well     Feels well, energy levels are good. On thyroid and testosterone. Sees Dr. Gomez. Vision is normal.  See dictated note.  LINDSEY Deluna MD

## 2022-12-06 NOTE — PROGRESS NOTES
Service Date: 2022    [place] [address]    RE:  Amrit Asher  MRN:  [id number]  :  1980    Dear Dr. Hayes:    I understand you are Mr. Asher's his new primary care physician.  We saw him as part of a virtual video followup in Pituitary Clinic today.  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  He underwent successful endoscopic endonasal resection.  We have been following him for any recurrence.  He is on thyroid and testosterone replacement and follows up with Dr. Gomez, our pituitary endocrinologist.  He is feeling well.  His energy levels are good.  He has no headaches, and his vision is normal.    Over the video platform, his general appearance was good.  He has male pattern baldness.  He appears comfortable.  His extraocular movements are full.  There is no ptosis.  Facial strength is normal.  Speech and language are normal.  He is moving his upper extremities with good strength and coordination.    REVIEW OF STUDIES:  We went over his MRI from 2022 and compared it to his preoperative studies.  We do not see any obvious residual or recurrent tumor.  The optic apparatus is completely decompressed.  The pituitary stalk and gland are deviated to the left, as before.  Overall, his imaging looks favorable.  There is some fullness in the right parasellar space, but we do not see any obvious tumor.    IMPRESSION AND PLAN:  Given his clinical and radiographic stability, we will repeat an MRI in 2 years.  This can be done at [facility] for his convenience.  He will continue to follow up with you and Dr. Gomez as scheduled.  Please do not hesitate to contact us with questions.    Sincerely,    Steve Deluna MD        D: 2022   T: 2022   MT: martín    Name:     AMRIT ASHER  MRN:      [id number]        Account:      [id number]   :       1980           Service Date: 12/06/2022       Document: [id number]

## 2022-12-06 NOTE — PATIENT INSTRUCTIONS
Repeat MRI brain - pituitary protocol with contrast at [facility] in two years (Not ordered)    Continue scheduled follow up with Dr. Gomez in endocrinology    Follow up by video or telephone in two years

## 2022-12-06 NOTE — LETTER
2022      RE: Luisito Asher  [address] [address]       Ld is a 42 year old who is being evaluated via a billable video visit.      How would you like to obtain your AVS? MyChart  If the video visit is dropped, the invitation should be resent by: Text to cell phone: [phone]  Will anyone else be joining your video visit? No        Video-Visit Details    Video Start Time: 1038    Type of service:  Video Visit    Video End Time:1048    Originating Location (pt. Location): Other work        Distant Location (provider location):  On-site    Platform used for Video Visit: [employer] Antonio     Feels well, energy levels are good. On thyroid and testosterone. Sees Dr. Gomez. Vision is normal.  See dictated note.  LINDSEY Deluna MD    Service Date: 2022    Vidal Hayes MD  [address]  [address]    RE:  Luisito Asher  MRN:  [id number]  :  1980    Dear Dr. Hayes:    I understand you are Mr. Asher's his new primary care physician.  We saw him as part of a virtual video followup in Pituitary Clinic today.  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  He underwent successful endoscopic endonasal resection.  We have been following him for any recurrence.  He is on thyroid and testosterone replacement and follows up with Dr. Gomez, our pituitary endocrinologist.  He is feeling well.  His energy levels are good.  He has no headaches, and his vision is normal.    Over the video platform, his general appearance was good.  He has male pattern baldness.  He appears comfortable.  His extraocular movements are full.  There is no ptosis.  Facial strength is normal.  Speech and language are normal.  He is moving his upper extremities with good strength and coordination.    REVIEW OF STUDIES:  We went over his MRI from 2022 and compared it to his preoperative studies.  We do not see any obvious residual or  recurrent tumor.  The optic apparatus is completely decompressed.  The pituitary stalk and gland are deviated to the left, as before.  Overall, his imaging looks favorable.  There is some fullness in the right parasellar space, but we do not see any obvious tumor.    IMPRESSION AND PLAN:  Given his clinical and radiographic stability, we will repeat an MRI in 2 years.  This can be done at [facility] for his convenience.  He will continue to follow up with you and Dr. Gomez as scheduled.  Please do not hesitate to contact us with questions.      D: 2022   T: 2022   MT: martín    Name:     AMRIT DYSON  MRN:      [id number]        Account:      [id number]   :      1980           Service Date: 2022       Document: [id number]        Steve Deluna MD

## 2023-02-09 DIAGNOSIS — E29.1 HYPOGONADISM MALE: ICD-10-CM

## 2023-02-09 DIAGNOSIS — E23.0 HYPOPITUITARISM (H): ICD-10-CM

## 2023-02-11 NOTE — TELEPHONE ENCOUNTER
"   testosterone (ANDROGEL 1.62 % PUMP) 20.25 MG/ACT gel  Last Written Prescription Date:  8/22/22  Last Fill Quantity: 75g,   # refills: 5  Last Office Visit :3/3/21  Future Office visit:  None    \"  RTC with me in 1 year\"     Scheduling has been notified to contact the pt for appointment.      Routing refill request to provider for review/approval because:   Controlled. lv 3/3/21  "

## 2023-02-13 RX ORDER — TESTOSTERONE 1.62 MG/G
GEL TRANSDERMAL
Qty: 75 G | Refills: 0 | Status: SHIPPED | OUTPATIENT
Start: 2023-02-13 | End: 2023-03-15

## 2023-03-15 DIAGNOSIS — E23.0 HYPOPITUITARISM (H): ICD-10-CM

## 2023-03-15 DIAGNOSIS — E29.1 HYPOGONADISM MALE: ICD-10-CM

## 2023-03-15 RX ORDER — TESTOSTERONE 1.62 MG/G
GEL TRANSDERMAL
Qty: 75 G | Refills: 1 | Status: SHIPPED | OUTPATIENT
Start: 2023-03-15 | End: 2023-05-05

## 2023-03-15 NOTE — TELEPHONE ENCOUNTER
TESTOSTERONE 1.62% GEL PUMP  Last Written Prescription Date:  2/13/2023  Last Fill Quantity: 75,   # refills: 0  Last Office Visit :  3/3/2021  Future Office visit:   5/24/2023    Routing refill request to provider for review/approval because:  Drug not on the [facility], [facility] or [facility] refill protocol or controlled substance      Selena Rogel RN  Central Triage Red Flags/Med Refills

## 2023-04-12 DIAGNOSIS — E23.0 HYPOPITUITARISM (H): ICD-10-CM

## 2023-04-14 RX ORDER — LEVOTHYROXINE SODIUM 100 UG/1
100 TABLET ORAL DAILY
Qty: 90 TABLET | Refills: 1 | Status: SHIPPED | OUTPATIENT
Start: 2023-04-14 | End: 2023-05-24

## 2023-04-14 NOTE — TELEPHONE ENCOUNTER
LEVOTHYROXINE 100 MCG TABLET  Last Written Prescription Date:   3/29/2022  Last Fill Quantity: 90,   # refills: 3  Last Office Visit :  3/3/2021  Future Office visit:   5/24/2023    Routing refill request to provider for review/approval because:  Over 2 years since last office visit.  Has visit in May 2023.    Refer to Provider for review and refills per Providers recommendations.       Selena Rogel RN  Central Triage Red Flags/Med Refills

## 2023-05-03 DIAGNOSIS — E29.1 HYPOGONADISM MALE: ICD-10-CM

## 2023-05-03 DIAGNOSIS — E23.0 HYPOPITUITARISM (H): ICD-10-CM

## 2023-05-05 NOTE — TELEPHONE ENCOUNTER
TESTOSTERONE 1.62% GEL PUMP  Last Written Prescription Date:   3/15/2023  Last Fill Quantity: 75,   # refills: 1  Last Office Visit :  3/3/2021  Future Office visit:   5/24/2023    Routing refill request to provider for review/approval because:  Drug not on the [facility], [facility] or [facility] refill protocol or controlled substance      Sleena Rogel RN  Central Triage Red Flags/Med Refills

## 2023-05-06 RX ORDER — TESTOSTERONE 1.62 MG/G
GEL TRANSDERMAL
Qty: 75 G | Refills: 5 | Status: SHIPPED | OUTPATIENT
Start: 2023-05-06 | End: 2023-05-24

## 2023-05-24 ENCOUNTER — OFFICE VISIT (OUTPATIENT)
Dept: ENDOCRINOLOGY | Facility: CLINIC | Age: 43
End: 2023-05-24
Payer: COMMERCIAL

## 2023-05-24 VITALS
SYSTOLIC BLOOD PRESSURE: 127 MMHG | DIASTOLIC BLOOD PRESSURE: 89 MMHG | HEART RATE: 59 BPM | HEIGHT: 72 IN | BODY MASS INDEX: 27.77 KG/M2 | OXYGEN SATURATION: 99 % | WEIGHT: 205 LBS

## 2023-05-24 DIAGNOSIS — E29.1 HYPOGONADISM MALE: ICD-10-CM

## 2023-05-24 DIAGNOSIS — D35.2 PITUITARY ADENOMA (H): ICD-10-CM

## 2023-05-24 DIAGNOSIS — E23.0 HYPOPITUITARISM (H): Primary | ICD-10-CM

## 2023-05-24 PROCEDURE — 99214 OFFICE O/P EST MOD 30 MIN: CPT | Mod: GC | Performed by: INTERNAL MEDICINE

## 2023-05-24 RX ORDER — LEVOTHYROXINE SODIUM 112 UG/1
112 TABLET ORAL DAILY
Qty: 90 TABLET | Refills: 3 | Status: SHIPPED | OUTPATIENT
Start: 2023-05-24 | End: 2024-06-06

## 2023-05-24 RX ORDER — TESTOSTERONE 1.62 MG/G
GEL TRANSDERMAL
Qty: 75 G | Refills: 5 | Status: SHIPPED | OUTPATIENT
Start: 2023-05-24 | End: 2023-11-04

## 2023-05-24 ASSESSMENT — PAIN SCALES - GENERAL: PAINLEVEL: NO PAIN (0)

## 2023-05-24 NOTE — PROGRESS NOTES
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 Post op course complicated by secondary hypopituitarism, secondary hypogonadism and transient low growth hormone which spontaneously resolved.     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 10/2022 labs reviewed notable for T4 near the lower end of normal. We would like it to be mid range. Testosterone within normal range.   - Continue 3 pumps of testosterone daily. Patient requested travel sachets but has 5 refills remaining so have not ordered   - Will increase synthroid to 112 mcg today   - CBC, PSA check ordered.     # Weakness muscle body pain   low IGF1 42 to increased 86 in 2/2021.     # HPA axis  Seems intact    RTC with me in 1 year    Above plan discussed with Dr. Jason Bose MD PGY1    Attending tie-in note  I saw the patient with Dr. Bose PGY1 and directly examined patient and discussed. Agree above note and plan.     35  minutes spent on the date of the encounter doing chart review, history and exam, documentation and further activities as noted above.    Leanna Gomez MD  Staff Physician  Endocrinology and Metabolism  [facility]  License: [id number]  Pager: [phone]    Interval History as of 5/24/2023 : Last seen march 2021 although has labs 10/2022. Patient denies any new changes in weight, fatigue, body ache, HA or vision changes. Wonders about the long terms effects of taking such hormone  supplements and if his pituitary might ever recover to a point of not needing hormones.Reviewed the benefits/risk of hormone replacement with patient and also set expectations about the recovery of pituitary. Patient will likely need long term hormone replacements.   HPI: A 38 yo male here with post pituitary apoplexy follow up. Referral from Dr. Deluna.   Patient had sudden onset of HA and right side vision disturbance, and he went to ER and found out pituitary tumor. He went to Neurologist office next day then he was recommended Dr. Deluna and next day patient went to [facility]SS (11/01/2019). Perioperative no complications, his right double vision persisted until early 12/2019 and currently resolved.     Past Medical/Surgical History:  No past medical history on file.  Past Surgical History:   Procedure Laterality Date     OPTICAL TRACKING SYSTEM ENDOSCOPIC RESECTION TUMOR CRANIAL N/A 11/1/2019    Procedure: Endoscopic transnasal resection of tumor;  Surgeon: Steve Deluna MD;  Location:  OR       Allergies:  No Known Allergies    Current Medications   Current Outpatient Medications   Medication     acetaminophen (TYLENOL) 500 MG tablet     levothyroxine (SYNTHROID/LEVOTHROID) 112 MCG tablet     testosterone (ANDROGEL 1.62 % PUMP) 20.25 MG/ACT gel     Testosterone 20.25 MG/1.25GM (1.62%) GEL     sodium chloride (OCEAN) 0.65 % nasal spray     No current facility-administered medications for this visit.       Family History:  No family history on file.    Social History:  Social History     Tobacco Use     Smoking status: Never     Smokeless tobacco: Never   Vaping Use     Vaping status: Not on file   Substance Use Topics     Alcohol use: Yes   [employer], Lives with wife and 3 kids (8,5,4)    ROS:  Full review of systems taken with the help of the intake sheet. Otherwise a complete 14 point review of systems was taken and is negative unless stated in the history above.    Physical Exam:    Vitals: /89 (BP Location: Right arm, Patient Position: Sitting, Cuff Size: Adult Regular)   Pulse 59   Ht 1.829 m (6')   Wt 93 kg (205 lb)   SpO2 99%   BMI 27.80 kg/m    BMI= Body mass index is 27.8 kg/m .   General: well appearing, no acute distress, pleasant and conversant,   Mental Status/neuro: alert and oriented  Face: symmetrical, normal facial color  Visions:  Extraocular movements are normal. There is no ptosis  Eyes: anicteric, no proptosis or lid lag  Resp: no acute distress        Labs : I reviewed data from epic and extract and summarize the pertinent data here.      1/9/2020 12:16   Sodium 139   Potassium 4.2   Chloride 108   Carbon Dioxide 28   Urea Nitrogen 18   Creatinine 0.96   GFR Estimate >90   GFR Estimate If Black >90   Calcium 9.3   Anion Gap 3   Adrenal Corticotropin 13   Alpha Subunit Pituitary Tumor Marker 0.2   Cortisol Serum 6.4   FSH 3.0   Growth Hormone 0.2   Prolactin 6   T4 Free 0.87   Testosterone Total 241   TSH 0.57   Glucose 76   Ins Growth Factor 1 42 (L)   Lutropin 1.7     Lab Results   Component Value Date     01/09/2020      Lab Results   Component Value Date    POTASSIUM 4.2 01/09/2020     Lab Results   Component Value Date    CHLORIDE 108 01/09/2020     Lab Results   Component Value Date    MARK 9.3 01/09/2020     Lab Results   Component Value Date    CO2 28 01/09/2020     Lab Results   Component Value Date    BUN 18 01/09/2020     Lab Results   Component Value Date    CR 0.96 01/09/2020     Lab Results   Component Value Date    GLC 76 01/09/2020     Lab Results   Component Value Date    TSH 0.57 01/09/2020     Lab Results   Component Value Date    T4 0.87 01/09/2020     Lab Results   Component Value Date    LH 1.7 01/09/2020     Lab Results   Component Value Date    FSH 3.0 01/09/2020     Lab Results   Component Value Date    PROLACTIN 6 01/09/2020           MRI Brain: I personally reviewed the original images and agree with the below reports.   Results for  orders placed in visit on 01/09/20   MRI Brain with & without gadolinium with pituitary protocol [[id number]]    Narrative Brain/ Pituitary MRI without and with contrast    History: Pituitary resection, benign neoplasm of pituitary gland and  craniopharyngeal duct.    Comparison:  10/23/2019 preoperative pituitary MRI.    Technique: Axial diffusion and FLAIR images of the whole brain  obtained without intravenous contrast. Sagittal T1 and T2-weighted,  coronal T2-weighted, coronal T1-weighted images with focus on the  sella were obtained without intravenous contrast. Post intravenous  contrast using gadolinium coronal and sagittal T1-weighted images were  obtained focused on the sella. Dynamic postcontrast coronal  T1-weighted images were also obtained.    Contrast: 10 mL Gadovist.    Findings:     Postoperative changes of the transnasal endoscopic excision of  pituitary macroadenoma. No residual mass is noted. Pituitary stalk is  deviated to the left.    The optic chiasm appears intact and not displaced.    The major cavernous carotid vascular flow-voids appear patent.      FLAIR images through the whole brain are unremarkable, and demonstrate  no mass effect, midline shift, or significant enlargement of the  ventricles.      Impression Impression: No evidence of residual mass within the sella.    I have personally reviewed the examination and initial interpretation  and I agree with the findings.    AMRIT LOPEZ MD

## 2023-05-24 NOTE — LETTER
5/24/2023       RE: Luisito Asher  [address] [facility] [address]     Dear Colleague,    Thank you for referring your patient, Luisito Asher, to the [facility] [place] at [facility]. Please see a copy of my visit note below.     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 Post op course complicated by secondary hypopituitarism, secondary hypogonadism and transient low growth hormone which spontaneously resolved.     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 10/2022 labs reviewed notable for T4 near the lower end of normal. We would like it to be mid range. Testosterone within normal range.   - Continue 3 pumps of testosterone daily. Patient requested travel sachets but has 5 refills remaining so have not ordered   - Will increase synthroid to 112 mcg today   - CBC, PSA check ordered.     # Weakness muscle body pain   low IGF1 42 to increased 86 in 2/2021.     # HPA axis  Seems intact    RTC with me in 1 year    Above plan discussed with Dr. Jason Bose MD PGY1    Attending tie-in note  I saw the patient with Dr. Bose PGY1 and directly examined patient and discussed. Agree above note and plan.     35  minutes spent on the date of the encounter doing chart review, history and exam, documentation and further activities as noted above.    Leanna Gomez MD  Staff Physician  Endocrinology and  Metabolism  [facility]  License: [id number]  Pager: [phone]    Interval History as of 5/24/2023 : Last seen march 2021 although has labs 10/2022. Patient denies any new changes in weight, fatigue, body ache, HA or vision changes. Wonders about the long terms effects of taking such hormone supplements and if his pituitary might ever recover to a point of not needing hormones.Reviewed the benefits/risk of hormone replacement with patient and also set expectations about the recovery of pituitary. Patient will likely need long term hormone replacements.   HPI: A 38 yo male here with post pituitary apoplexy follow up. Referral from Dr. Deluna.   Patient had sudden onset of HA and right side vision disturbance, and he went to ER and found out pituitary tumor. He went to Neurologist office next day then he was recommended Dr. Deluna and next day patient went to [facility] (11/01/2019). Perioperative no complications, his right double vision persisted until early 12/2019 and currently resolved.     Past Medical/Surgical History:  No past medical history on file.  Past Surgical History:   Procedure Laterality Date    OPTICAL TRACKING SYSTEM ENDOSCOPIC RESECTION TUMOR CRANIAL N/A 11/1/2019    Procedure: Endoscopic transnasal resection of tumor;  Surgeon: Steve Deluna MD;  Location:  OR       Allergies:  No Known Allergies    Current Medications   Current Outpatient Medications   Medication    acetaminophen (TYLENOL) 500 MG tablet    levothyroxine (SYNTHROID/LEVOTHROID) 112 MCG tablet    testosterone (ANDROGEL 1.62 % PUMP) 20.25 MG/ACT gel    Testosterone 20.25 MG/1.25GM (1.62%) GEL    sodium chloride (OCEAN) 0.65 % nasal spray     No current facility-administered medications for this visit.       Family History:  No family history on file.    Social History:  Social History     Tobacco Use    Smoking status: Never    Smokeless tobacco: Never   Vaping Use    Vaping status: Not on file    Substance Use Topics    Alcohol use: Yes   [employer] company, Lives with wife and 3 kids (8,5,4)    ROS:  Full review of systems taken with the help of the intake sheet. Otherwise a complete 14 point review of systems was taken and is negative unless stated in the history above.    Physical Exam:   Vitals: /89 (BP Location: Right arm, Patient Position: Sitting, Cuff Size: Adult Regular)   Pulse 59   Ht 1.829 m (6')   Wt 93 kg (205 lb)   SpO2 99%   BMI 27.80 kg/m    BMI= Body mass index is 27.8 kg/m .   General: well appearing, no acute distress, pleasant and conversant,   Mental Status/neuro: alert and oriented  Face: symmetrical, normal facial color  Visions:  Extraocular movements are normal. There is no ptosis  Eyes: anicteric, no proptosis or lid lag  Resp: no acute distress        Labs : I reviewed data from epic and extract and summarize the pertinent data here.      1/9/2020 12:16   Sodium 139   Potassium 4.2   Chloride 108   Carbon Dioxide 28   Urea Nitrogen 18   Creatinine 0.96   GFR Estimate >90   GFR Estimate If Black >90   Calcium 9.3   Anion Gap 3   Adrenal Corticotropin 13   Alpha Subunit Pituitary Tumor Marker 0.2   Cortisol Serum 6.4   FSH 3.0   Growth Hormone 0.2   Prolactin 6   T4 Free 0.87   Testosterone Total 241   TSH 0.57   Glucose 76   Ins Growth Factor 1 42 (L)   Lutropin 1.7     Lab Results   Component Value Date     01/09/2020      Lab Results   Component Value Date    POTASSIUM 4.2 01/09/2020     Lab Results   Component Value Date    CHLORIDE 108 01/09/2020     Lab Results   Component Value Date    MARK 9.3 01/09/2020     Lab Results   Component Value Date    CO2 28 01/09/2020     Lab Results   Component Value Date    BUN 18 01/09/2020     Lab Results   Component Value Date    CR 0.96 01/09/2020     Lab Results   Component Value Date    GLC 76 01/09/2020     Lab Results   Component Value Date    TSH 0.57 01/09/2020     Lab Results   Component Value Date    T4 0.87  01/09/2020     Lab Results   Component Value Date    LH 1.7 01/09/2020     Lab Results   Component Value Date    FSH 3.0 01/09/2020     Lab Results   Component Value Date    PROLACTIN 6 01/09/2020           MRI Brain: I personally reviewed the original images and agree with the below reports.   Results for orders placed in visit on 01/09/20   MRI Brain with & without gadolinium with pituitary protocol [[id number]]    Narrative Brain/ Pituitary MRI without and with contrast    History: Pituitary resection, benign neoplasm of pituitary gland and  craniopharyngeal duct.    Comparison:  10/23/2019 preoperative pituitary MRI.    Technique: Axial diffusion and FLAIR images of the whole brain  obtained without intravenous contrast. Sagittal T1 and T2-weighted,  coronal T2-weighted, coronal T1-weighted images with focus on the  sella were obtained without intravenous contrast. Post intravenous  contrast using gadolinium coronal and sagittal T1-weighted images were  obtained focused on the sella. Dynamic postcontrast coronal  T1-weighted images were also obtained.    Contrast: 10 mL Gadovist.    Findings:     Postoperative changes of the transnasal endoscopic excision of  pituitary macroadenoma. No residual mass is noted. Pituitary stalk is  deviated to the left.    The optic chiasm appears intact and not displaced.    The major cavernous carotid vascular flow-voids appear patent.      FLAIR images through the whole brain are unremarkable, and demonstrate  no mass effect, midline shift, or significant enlargement of the  ventricles.      Impression Impression: No evidence of residual mass within the sella.    I have personally reviewed the examination and initial interpretation  and I agree with the findings.    MD ministerio BARTON b

## 2023-05-24 NOTE — NURSING NOTE
Chief Complaint   Patient presents with     Endocrine Problem     Vital signs:      BP: 127/89 Pulse: 59     SpO2: 99 %     Height: 182.9 cm (6') Weight: 93 kg (205 lb)  Estimated body mass index is 27.8 kg/m  as calculated from the following:    Height as of this encounter: 1.829 m (6').    Weight as of this encounter: 93 kg (205 lb).

## 2023-05-25 ENCOUNTER — TELEPHONE (OUTPATIENT)
Dept: ENDOCRINOLOGY | Facility: CLINIC | Age: 43
End: 2023-05-25
Payer: COMMERCIAL

## 2023-05-25 NOTE — TELEPHONE ENCOUNTER
Communication Summary: LVM and sent MyC    Appointment type: Return Endocrine  Provider: Dr. Gomez  Return date: 1 year   Speciality phone number: [phone]  Additional appointment(s) needed: N/A  Additional notes: N/A    Zeeshan Hidalgo on 5/25/2023 at 4:04 PM

## 2023-06-04 ENCOUNTER — HEALTH MAINTENANCE LETTER (OUTPATIENT)
Age: 43
End: 2023-06-04

## 2023-06-08 ENCOUNTER — TELEPHONE (OUTPATIENT)
Dept: ENDOCRINOLOGY | Facility: CLINIC | Age: 43
End: 2023-06-08
Payer: COMMERCIAL

## 2023-06-08 NOTE — TELEPHONE ENCOUNTER
Patient call:     Appointment type: return endocrine   Provider: Jason  Return date: around 5/25/24  Speciality phone number: [phone]  Additional appointment(s) needed: labs   Additional notes: LVM and sent MyC to schedule f/u with Dr. Jason Feldman on 6/8/2023 at 4:57 PM

## 2023-11-03 DIAGNOSIS — E29.1 HYPOGONADISM MALE: ICD-10-CM

## 2023-11-03 DIAGNOSIS — E23.0 HYPOPITUITARISM (H): ICD-10-CM

## 2023-11-04 RX ORDER — TESTOSTERONE 1.62 MG/G
GEL TRANSDERMAL
Qty: 75 G | Refills: 5 | Status: SHIPPED | OUTPATIENT
Start: 2023-11-04 | End: 2024-04-06

## 2023-11-04 NOTE — TELEPHONE ENCOUNTER
testosterone (ANDROGEL 1.62 % PUMP) 20.25 MG/ACT gel  75 g 5 5/24/2023 5/24/2023  [facility] [place]    Leanna Gomez MD  Endocrinology, Diabetes, and Metabolism    Nv: none      Return in about 1 year (around 5/24/2024).     Routed because: controlled

## 2024-02-06 NOTE — TELEPHONE ENCOUNTER
PA Initiation    Medication: testosterone (ANDROGEL 1.62 % PUMP) 20.25 MG/ACT gel - INITIATED  Insurance Company: [employer] ([facility]) - Phone [phone] Fax [phone]  Pharmacy Filling the Rx: [id number] [place] [facility] [place] - [address] [place] [address]  Filling Pharmacy Phone: [phone]  Filling Pharmacy Fax:    Start Date: 8/27/2020        
Prior Authorization Approval    Authorization Effective Date: 8/27/2020  Authorization Expiration Date: 8/28/2021  Medication: testosterone (ANDROGEL 1.62 % PUMP) 20.25 MG/ACT gel - APPROVED     Insurance Company: [employer] ([facility]) - Phone [phone] Fax [phone]  Which Pharmacy is filling the prescription (Not needed for infusion/clinic administered): [id number] [place] [address]  Pharmacy Notified: Yes  Patient Notified: Yes    Pharmacy having trouble processing the claim, they are reaching out to help desk to troubleshoot.    Dacia Tony PA Team      
Prior Authorization Retail Medication Request    Medication/Dose: Testosterone  1.62% gel pump apply 2 pumps daily   ICD code (if different than what is on RX):  E29.1   Previously Tried and Failed: NEW start   Rationale:  testosterone total pre labs   2/12/20 149 ( 240-950)  1/9/20 241 ( 240-950)   Pituitary Tumor his levels will just go lower without treatment         Insurance Name: [employer]   Insurance ID:  [id number]      Pharmacy Information (if different than what is on RX)  Name: [facility]   Phone:  [phone]  Kiara Luis RN on 8/27/2020 at 5:20 PM      
none

## 2024-04-01 DIAGNOSIS — E29.1 HYPOGONADISM MALE: ICD-10-CM

## 2024-04-01 DIAGNOSIS — E23.0 HYPOPITUITARISM (H): ICD-10-CM

## 2024-04-04 ENCOUNTER — TELEPHONE (OUTPATIENT)
Dept: ENDOCRINOLOGY | Facility: CLINIC | Age: 44
End: 2024-04-04
Payer: COMMERCIAL

## 2024-04-04 NOTE — TELEPHONE ENCOUNTER
testosterone (ANDROGEL 1.62 % PUMP) 20.25 MG/ACT gel: ADDRESSED IN 4-1-24 RF ENCOUNTER SENT HIGH PRIORITY

## 2024-04-04 NOTE — TELEPHONE ENCOUNTER
[facility] Call Center    Phone Message    May a detailed message be left on voicemail: yes     Reason for Call: Medication Refill Request    Has the patient contacted the pharmacy for the refill? Yes   Name of medication being requested: testosterone (ANDROGEL 1.62 % PUMP) 20.25 MG/ACT gel   Provider who prescribed the medication: Dr. Gomez  Pharmacy:   [facility] [id number] [address]     Date medication is needed: asap patient stated he is out       Action Taken: Message routed to:  [facility] ([facility]):      Travel Screening: Not Applicable

## 2024-04-04 NOTE — TELEPHONE ENCOUNTER
TESTOSTERONE 1.62% GEL PUMP       Last Written Prescription Date:  11-4-23  Last Fill Quantity: 75g,   # refills: 5  Last Office Visit : 5-24-23  Future Office visit:  none    Routing refill request to provider for review/approval because:  Drug not on the [facility] [place], P or [facility] refill protocol or controlled substance  Overdue labs: ALT, LIPID PANEL,hematocrit, TESTOSTERONE AND PSA      2ND REQUEST, PT CALL 4-4-24

## 2024-04-06 RX ORDER — TESTOSTERONE 1.62 MG/G
GEL TRANSDERMAL
Qty: 75 G | Refills: 5 | Status: SHIPPED | OUTPATIENT
Start: 2024-04-06 | End: 2024-09-03

## 2024-06-01 DIAGNOSIS — E23.0 HYPOPITUITARISM (H): ICD-10-CM

## 2024-06-06 ENCOUNTER — MYC REFILL (OUTPATIENT)
Dept: ENDOCRINOLOGY | Facility: CLINIC | Age: 44
End: 2024-06-06
Payer: COMMERCIAL

## 2024-06-06 DIAGNOSIS — E23.0 HYPOPITUITARISM (H): ICD-10-CM

## 2024-06-06 RX ORDER — LEVOTHYROXINE SODIUM 112 UG/1
112 TABLET ORAL DAILY
Qty: 90 TABLET | Refills: 3 | Status: SHIPPED | OUTPATIENT
Start: 2024-06-06

## 2024-06-07 RX ORDER — LEVOTHYROXINE SODIUM 112 UG/1
112 TABLET ORAL DAILY
Qty: 30 TABLET | Refills: 11 | OUTPATIENT
Start: 2024-06-07

## 2024-06-07 NOTE — TELEPHONE ENCOUNTER
levothyroxine (SYNTHROID/LEVOTHROID) 112 MCG tablet 90 tablet 3 6/6/2024     Should have refills on file. Pharmacy sent message. Rx refill denied    Chanelle Alicea RN  P Red Flag Triage/MRT

## 2024-07-14 ENCOUNTER — HEALTH MAINTENANCE LETTER (OUTPATIENT)
Age: 44
End: 2024-07-14

## 2024-08-08 DIAGNOSIS — D35.2 PITUITARY MACROADENOMA (H): Primary | ICD-10-CM

## 2024-08-14 ENCOUNTER — TELEPHONE (OUTPATIENT)
Dept: NEUROSURGERY | Facility: CLINIC | Age: 44
End: 2024-08-14
Payer: COMMERCIAL

## 2024-08-14 NOTE — TELEPHONE ENCOUNTER
Left Voicemail (1st Attempt) and Sent Mychart (1st Attempt) for the patient to call back and schedule the following:    Appointment type: Return Vascular Neurosurg  Provider: Gorge  Return date: On or near 12/6/24  Specialty phone number: [phone]  Additional appointment(s) needed: MRI  Additonal Notes: 2yr follow up

## 2024-08-19 NOTE — TELEPHONE ENCOUNTER
Left Voicemail (2nd Attempt) for the patient to call back and schedule the following:      Appointment type: Return Tumor Neurosurg  Provider: Gorge  Return date: On or near 12/6/24  Specialty phone number: [phone]  Additional appointment(s) needed: MRI  Additonal Notes: 2yr follow up       Toshia Maciel on 8/19/2024 at 10:11 AM

## 2024-08-27 ENCOUNTER — LAB (OUTPATIENT)
Dept: LAB | Facility: CLINIC | Age: 44
End: 2024-08-27
Payer: COMMERCIAL

## 2024-08-27 DIAGNOSIS — E23.0 HYPOPITUITARISM (H): ICD-10-CM

## 2024-08-27 LAB
ERYTHROCYTE [DISTWIDTH] IN BLOOD BY AUTOMATED COUNT: 12.5 % (ref 10–15)
HCT VFR BLD AUTO: 46.1 % (ref 40–53)
HGB BLD-MCNC: 15.7 G/DL (ref 13.3–17.7)
MCH RBC QN AUTO: 28.5 PG (ref 26.5–33)
MCHC RBC AUTO-ENTMCNC: 34.1 G/DL (ref 31.5–36.5)
MCV RBC AUTO: 84 FL (ref 78–100)
PLATELET # BLD AUTO: 201 10E3/UL (ref 150–450)
PSA SERPL DL<=0.01 NG/ML-MCNC: 0.34 NG/ML (ref 0–2.5)
RBC # BLD AUTO: 5.5 10E6/UL (ref 4.4–5.9)
WBC # BLD AUTO: 5.6 10E3/UL (ref 4–11)

## 2024-08-27 PROCEDURE — 85027 COMPLETE CBC AUTOMATED: CPT

## 2024-08-27 PROCEDURE — 36415 COLL VENOUS BLD VENIPUNCTURE: CPT

## 2024-08-27 PROCEDURE — 84153 ASSAY OF PSA TOTAL: CPT

## 2024-08-30 DIAGNOSIS — E23.0 HYPOPITUITARISM (H): ICD-10-CM

## 2024-08-30 DIAGNOSIS — E29.1 HYPOGONADISM MALE: ICD-10-CM

## 2024-08-30 NOTE — TELEPHONE ENCOUNTER
: TESTOSTERONE 1.62% GEL PUMP       Last Written Prescription Date:  4-6-24  Last Fill Quantity: 75,   # refills: 5  Last Office Visit : 5-24-23 ( RTC 1 Y)  Future Office visit:  1-22-25    Routing refill request to provider for review/approval because:  Drug not on the [facility], P or [facility] refill protocol or controlled substance  Overdue labs:AST,ALT, LIPID PANEL, TESTOSTERONE  Next appt outside of RTC timeframe

## 2024-09-03 DIAGNOSIS — E29.1 HYPOGONADISM MALE: ICD-10-CM

## 2024-09-03 DIAGNOSIS — E23.0 HYPOPITUITARISM (H): ICD-10-CM

## 2024-09-03 RX ORDER — TESTOSTERONE 1.62 MG/G
GEL TRANSDERMAL
Qty: 75 G | Refills: 4 | OUTPATIENT
Start: 2024-09-03

## 2024-09-03 RX ORDER — TESTOSTERONE 1.62 MG/G
GEL TRANSDERMAL
Qty: 75 G | Refills: 5 | Status: SHIPPED | OUTPATIENT
Start: 2024-09-03

## 2025-01-25 DIAGNOSIS — E29.1 HYPOGONADISM MALE: ICD-10-CM

## 2025-01-25 DIAGNOSIS — E23.0 HYPOPITUITARISM: ICD-10-CM

## 2025-01-26 NOTE — TELEPHONE ENCOUNTER
Disp Refills Start End TRAVON   testosterone (ANDROGEL 1.62 % PUMP) 20.25 MG/ACT gel 75 g 5 9/3/2024 -- No   Sig: PLACE 3 PUMPS DAILY ONTO CLEAN, DRY INTACT SKIN.  For refills, please schedule a follow-up appointment.     Leanna Gomez MD  Endocrinology, Diabetes, and Metabolism  Lv 5/24/23.  Nv 6/11/25    Routed because: controlled . Lv 5/24/23, hx 2 recent cancelled  Jason appts.   Androgen Agents [id number]/25/2025 07:51 AM   Protocol Details Lipid panel on file in past 12 mos    ALT on file within past 12 mos    Serum Testosterone on file within past 12 mos    Serum PSA on file within past 12 mos    Refills for this classification require provider review    Most recent blood pressure under 140/90 in past 6 months    Recent (6 mo) or future (30 days) visit within the authorizing provider's specialty    AST on file within past 12 mos

## 2025-01-29 NOTE — TELEPHONE ENCOUNTER
Patient calling to say he is still waiting for the medication. Please respond to the refill request. Thank you.

## 2025-01-30 RX ORDER — TESTOSTERONE 1.62 MG/G
GEL TRANSDERMAL
Qty: 75 G | Refills: 1 | Status: SHIPPED | OUTPATIENT
Start: 2025-01-30

## 2025-03-10 ENCOUNTER — MYC REFILL (OUTPATIENT)
Dept: ENDOCRINOLOGY | Facility: CLINIC | Age: 45
End: 2025-03-10
Payer: COMMERCIAL

## 2025-03-10 DIAGNOSIS — E23.0 HYPOPITUITARISM: ICD-10-CM

## 2025-03-10 DIAGNOSIS — E29.1 HYPOGONADISM MALE: ICD-10-CM

## 2025-03-10 RX ORDER — TESTOSTERONE 1.62 MG/G
GEL TRANSDERMAL
Qty: 75 G | Refills: 5 | Status: SHIPPED | OUTPATIENT
Start: 2025-03-10

## 2025-06-04 ENCOUNTER — LAB (OUTPATIENT)
Dept: LAB | Facility: CLINIC | Age: 45
End: 2025-06-04
Payer: COMMERCIAL

## 2025-06-04 DIAGNOSIS — E23.0 HYPOPITUITARISM: ICD-10-CM

## 2025-06-04 DIAGNOSIS — E29.1 HYPOGONADISM MALE: ICD-10-CM

## 2025-06-04 LAB
ALBUMIN SERPL BCG-MCNC: 4.6 G/DL (ref 3.5–5.2)
ALP SERPL-CCNC: 62 U/L (ref 40–150)
ALT SERPL W P-5'-P-CCNC: 52 U/L (ref 0–70)
ANION GAP SERPL CALCULATED.3IONS-SCNC: 11 MMOL/L (ref 7–15)
AST SERPL W P-5'-P-CCNC: 32 U/L (ref 0–45)
BILIRUB SERPL-MCNC: 0.4 MG/DL
BUN SERPL-MCNC: 18.2 MG/DL (ref 6–20)
CALCIUM SERPL-MCNC: 9.6 MG/DL (ref 8.8–10.4)
CHLORIDE SERPL-SCNC: 106 MMOL/L (ref 98–107)
CREAT SERPL-MCNC: 1.12 MG/DL (ref 0.67–1.17)
EGFRCR SERPLBLD CKD-EPI 2021: 83 ML/MIN/1.73M2
ERYTHROCYTE [DISTWIDTH] IN BLOOD BY AUTOMATED COUNT: 13.1 % (ref 10–15)
GLUCOSE SERPL-MCNC: 97 MG/DL (ref 70–99)
HCO3 SERPL-SCNC: 26 MMOL/L (ref 22–29)
HCT VFR BLD AUTO: 46.7 % (ref 40–53)
HGB BLD-MCNC: 15.7 G/DL (ref 13.3–17.7)
MCH RBC QN AUTO: 28.1 PG (ref 26.5–33)
MCHC RBC AUTO-ENTMCNC: 33.6 G/DL (ref 31.5–36.5)
MCV RBC AUTO: 84 FL (ref 78–100)
PLATELET # BLD AUTO: 180 10E3/UL (ref 150–450)
POTASSIUM SERPL-SCNC: 4.7 MMOL/L (ref 3.4–5.3)
PROT SERPL-MCNC: 7 G/DL (ref 6.4–8.3)
PSA SERPL DL<=0.01 NG/ML-MCNC: 0.31 NG/ML (ref 0–2.5)
RBC # BLD AUTO: 5.59 10E6/UL (ref 4.4–5.9)
SODIUM SERPL-SCNC: 143 MMOL/L (ref 135–145)
T4 FREE SERPL-MCNC: 1.3 NG/DL (ref 0.9–1.7)
TSH SERPL DL<=0.005 MIU/L-ACNC: 0.26 UIU/ML (ref 0.3–4.2)
WBC # BLD AUTO: 6.1 10E3/UL (ref 4–11)

## 2025-06-04 PROCEDURE — 80053 COMPREHEN METABOLIC PANEL: CPT

## 2025-06-04 PROCEDURE — 84403 ASSAY OF TOTAL TESTOSTERONE: CPT

## 2025-06-04 PROCEDURE — 85027 COMPLETE CBC AUTOMATED: CPT

## 2025-06-04 PROCEDURE — 84443 ASSAY THYROID STIM HORMONE: CPT

## 2025-06-04 PROCEDURE — 36415 COLL VENOUS BLD VENIPUNCTURE: CPT

## 2025-06-04 PROCEDURE — 84439 ASSAY OF FREE THYROXINE: CPT

## 2025-06-04 PROCEDURE — 84153 ASSAY OF PSA TOTAL: CPT

## 2025-06-08 LAB — TESTOST SERPL-MCNC: 300 NG/DL (ref 240–950)

## 2025-06-11 ENCOUNTER — OFFICE VISIT (OUTPATIENT)
Dept: ENDOCRINOLOGY | Facility: CLINIC | Age: 45
End: 2025-06-11
Payer: COMMERCIAL

## 2025-06-11 VITALS
SYSTOLIC BLOOD PRESSURE: 114 MMHG | HEART RATE: 84 BPM | OXYGEN SATURATION: 99 % | DIASTOLIC BLOOD PRESSURE: 83 MMHG | WEIGHT: 202 LBS | BODY MASS INDEX: 27.4 KG/M2

## 2025-06-11 DIAGNOSIS — E23.0 HYPOPITUITARISM: ICD-10-CM

## 2025-06-11 DIAGNOSIS — E29.1 HYPOGONADISM MALE: ICD-10-CM

## 2025-06-11 RX ORDER — TESTOSTERONE 1.62 MG/G
GEL TRANSDERMAL
Qty: 150 G | Refills: 5 | Status: SHIPPED | OUTPATIENT
Start: 2025-06-11

## 2025-06-11 ASSESSMENT — PAIN SCALES - GENERAL: PAINLEVEL_OUTOF10: NO PAIN (0)

## 2025-06-11 NOTE — NURSING NOTE
Chief Complaint   Patient presents with    Pituitary Problem     Vital signs:      BP: 114/83 Pulse: 84     SpO2: 99 %       Weight: 91.6 kg (202 lb)  Estimated body mass index is 27.4 kg/m  as calculated from the following:    Height as of 5/24/23: 1.829 m (6').    Weight as of this encounter: 91.6 kg (202 lb).

## 2025-06-11 NOTE — LETTER
6/11/2025       RE: Luisito Asher  [address] [place] [address]     Dear Colleague,    Thank you for referring your patient, Luisito Asher, to the [facility] ENDO[facility]OLOG[facility] [place] at [facility]. Please see a copy of my visit note below.     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 Post op course complicated by secondary hypopituitarism, secondary hypogonadism and transient low growth hormone which spontaneously resolved.     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 10/2022 labs reviewed notable for T4 near the lower end of normal. We would like it to be mid range. Testosterone within normal range.   - Continue 3 pumps of testosterone daily. Patient requested travel sachets but has 5 refills remaining so have not ordered   - Will increase synthroid to 112 mcg today   - CBC, PSA check ordered.     # Weakness muscle body pain  Low normal IGF1, no joint pain     # HPA axis  Seems intact    RTC with me in 1 year      30  minutes spent on the date of the encounter doing chart review, history and exam, documentation and further activities as noted above.    Leanna Gomez MD  Staff Physician  Endocrinology and Metabolism  [facility]  License: [id number]  Pager:  [phone]    Interval History as of 6/11/2025 : Patient has been doing well, but slight lower side of energy. Last seen 3 years ago. Medication compliance excellent for androgel 3 pumps daily.   Interval History as of 5/24/2023 : Last seen march 2021 although has labs 10/2022. Patient denies any new changes in weight, fatigue, body ache, HA or vision changes. Wonders about the long terms effects of taking such hormone supplements and if his pituitary might ever recover to a point of not needing hormones.Reviewed the benefits/risk of hormone replacement with patient and also set expectations about the recovery of pituitary. Patient will likely need long term hormone replacements.   HPI: A 38 yo male here with post pituitary apoplexy follow up. Referral from Dr. Deluna.   Patient had sudden onset of HA and right side vision disturbance, and he went to ER and found out pituitary tumor. He went to Neurologist office next day then he was recommended Dr. Deluna and next day patient went to [facility] (11/01/2019). Perioperative no complications, his right double vision persisted until early 12/2019 and currently resolved.     Past Medical/Surgical History:  No past medical history on file.  Past Surgical History:   Procedure Laterality Date     OPTICAL TRACKING SYSTEM ENDOSCOPIC RESECTION TUMOR CRANIAL N/A 11/1/2019    Procedure: Endoscopic transnasal resection of tumor;  Surgeon: Steve Deluna MD;  Location:  OR       Allergies:  No Known Allergies    Current Medications   Current Outpatient Medications   Medication Sig Dispense Refill     levothyroxine (SYNTHROID/LEVOTHROID) 112 MCG tablet Take 1 tablet (112 mcg) by mouth daily 90 tablet 3     testosterone (ANDROGEL 1.62 % PUMP) 20.25 MG/ACT gel PLACE 3 PUMPS DAILY ONTO CLEAN, DRY INTACT SKIN.  For refills, please schedule a lab draw appointment 2 weeks prior to upcoming visit  to schedule. . 75 g 5     Testosterone 20.25 MG/1.25GM (1.62%) GEL  Place 2 packets (40.5 mg) onto the skin daily For use when traveling. 60 packet 1     No current facility-administered medications for this visit.       Family History:  No family history on file.    Social History:  Social History     Tobacco Use     Smoking status: Never     Smokeless tobacco: Never   Substance Use Topics     Alcohol use: Yes   [employer], Lives with wife and 3 kids (8,5,4)    ROS:  Full review of systems taken with the help of the intake sheet. Otherwise a complete 14 point review of systems was taken and is negative unless stated in the history above.    Physical Exam:   Vitals: /83 (BP Location: Right arm, Patient Position: Sitting, Cuff Size: Adult Regular)   Pulse 84   Wt 91.6 kg (202 lb)   SpO2 99%   BMI 27.40 kg/m    BMI= Body mass index is 27.4 kg/m .   General: well appearing, no acute distress, pleasant and conversant,   Mental Status/neuro: alert and oriented  Face: symmetrical, normal facial color  Visions:  Extraocular movements are normal. There is no ptosis  Eyes: anicteric, no proptosis or lid lag  Resp: no acute distress        Labs : I reviewed data from epic and extract and summarize the pertinent data here.      1/9/2020 12:16   Sodium 139   Potassium 4.2   Chloride 108   Carbon Dioxide 28   Urea Nitrogen 18   Creatinine 0.96   GFR Estimate >90   GFR Estimate If Black >90   Calcium 9.3   Anion Gap 3   Adrenal Corticotropin 13   Alpha Subunit Pituitary Tumor Marker 0.2   Cortisol Serum 6.4   FSH 3.0   Growth Hormone 0.2   Prolactin 6   T4 Free 0.87   Testosterone Total 241   TSH 0.57   Glucose 76   Ins Growth Factor 1 42 (L)   Lutropin 1.7     Lab Results   Component Value Date     01/09/2020      Lab Results   Component Value Date    POTASSIUM 4.2 01/09/2020     Lab Results   Component Value Date    CHLORIDE 108 01/09/2020     Lab Results   Component Value Date    MARK 9.3 01/09/2020     Lab Results   Component Value Date    CO2 28 01/09/2020     Lab  Results   Component Value Date    BUN 18 01/09/2020     Lab Results   Component Value Date    CR 0.96 01/09/2020     Lab Results   Component Value Date    GLC 76 01/09/2020     Lab Results   Component Value Date    TSH 0.57 01/09/2020     Lab Results   Component Value Date    T4 0.87 01/09/2020     Lab Results   Component Value Date    LH 1.7 01/09/2020     Lab Results   Component Value Date    FSH 3.0 01/09/2020     Lab Results   Component Value Date    PROLACTIN 6 01/09/2020           MRI Brain: I personally reviewed the original images and agree with the below reports.   Results for orders placed in visit on 01/09/20   MRI Brain with & without gadolinium with pituitary protocol [[id number]]    Narrative Brain/ Pituitary MRI without and with contrast    History: Pituitary resection, benign neoplasm of pituitary gland and  craniopharyngeal duct.    Comparison:  10/23/2019 preoperative pituitary MRI.    Technique: Axial diffusion and FLAIR images of the whole brain  obtained without intravenous contrast. Sagittal T1 and T2-weighted,  coronal T2-weighted, coronal T1-weighted images with focus on the  sella were obtained without intravenous contrast. Post intravenous  contrast using gadolinium coronal and sagittal T1-weighted images were  obtained focused on the sella. Dynamic postcontrast coronal  T1-weighted images were also obtained.    Contrast: 10 mL Gadovist.    Findings:     Postoperative changes of the transnasal endoscopic excision of  pituitary macroadenoma. No residual mass is noted. Pituitary stalk is  deviated to the left.    The optic chiasm appears intact and not displaced.    The major cavernous carotid vascular flow-voids appear patent.      FLAIR images through the whole brain are unremarkable, and demonstrate  no mass effect, midline shift, or significant enlargement of the  ventricles.      Impression Impression: No evidence of residual mass within the sella.    I have personally reviewed the  examination and initial interpretation  and I agree with the findings.    AMRIT LOPEZ MD           Again, thank you for allowing me to participate in the care of your patient.      Sincerely,    Leanna Gomez MD

## 2025-06-11 NOTE — PROGRESS NOTES
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 Post op course complicated by secondary hypopituitarism, secondary hypogonadism and transient low growth hormone which spontaneously resolved.     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 10/2022 labs reviewed notable for T4 near the lower end of normal. We would like it to be mid range. Testosterone within normal range.   - Continue 3 pumps of testosterone daily. Patient requested travel sachets but has 5 refills remaining so have not ordered   - Will increase synthroid to 112 mcg today   - CBC, PSA check ordered.     # Weakness muscle body pain  Low normal IGF1, no joint pain     # HPA axis  Seems intact    RTC with me in 1 year      30  minutes spent on the date of the encounter doing chart review, history and exam, documentation and further activities as noted above.    Leanna Gomez MD  Staff Physician  Endocrinology and Metabolism  [facility]  License: [id number]  Pager: [phone]    Interval History as of 6/11/2025 : Patient has been doing well, but slight lower side of energy. Last seen 3 years ago. Medication compliance excellent for androgel 3 pumps daily.   Interval History as of 5/24/2023 : Last seen march 2021 although has labs 10/2022. Patient denies any new changes in  weight, fatigue, body ache, HA or vision changes. Wonders about the long terms effects of taking such hormone supplements and if his pituitary might ever recover to a point of not needing hormones.Reviewed the benefits/risk of hormone replacement with patient and also set expectations about the recovery of pituitary. Patient will likely need long term hormone replacements.   HPI: A 38 yo male here with post pituitary apoplexy follow up. Referral from Dr. Deluna.   Patient had sudden onset of HA and right side vision disturbance, and he went to ER and found out pituitary tumor. He went to Neurologist office next day then he was recommended Dr. Deluna and next day patient went to [facility] (11/01/2019). Perioperative no complications, his right double vision persisted until early 12/2019 and currently resolved.     Past Medical/Surgical History:  No past medical history on file.  Past Surgical History:   Procedure Laterality Date    OPTICAL TRACKING SYSTEM ENDOSCOPIC RESECTION TUMOR CRANIAL N/A 11/1/2019    Procedure: Endoscopic transnasal resection of tumor;  Surgeon: Steve Deluna MD;  Location:  OR       Allergies:  No Known Allergies    Current Medications   Current Outpatient Medications   Medication Sig Dispense Refill    levothyroxine (SYNTHROID/LEVOTHROID) 112 MCG tablet Take 1 tablet (112 mcg) by mouth daily 90 tablet 3    testosterone (ANDROGEL 1.62 % PUMP) 20.25 MG/ACT gel PLACE 3 PUMPS DAILY ONTO CLEAN, DRY INTACT SKIN.  For refills, please schedule a lab draw appointment 2 weeks prior to upcoming visit  to schedule. . 75 g 5    Testosterone 20.25 MG/1.25GM (1.62%) GEL Place 2 packets (40.5 mg) onto the skin daily For use when traveling. 60 packet 1     No current facility-administered medications for this visit.       Family History:  No family history on file.    Social History:  Social History     Tobacco Use    Smoking status: Never    Smokeless tobacco: Never   Substance Use  Topics    Alcohol use: Yes   [employer], Lives with wife and 3 kids (8,5,4)    ROS:  Full review of systems taken with the help of the intake sheet. Otherwise a complete 14 point review of systems was taken and is negative unless stated in the history above.    Physical Exam:   Vitals: /83 (BP Location: Right arm, Patient Position: Sitting, Cuff Size: Adult Regular)   Pulse 84   Wt 91.6 kg (202 lb)   SpO2 99%   BMI 27.40 kg/m    BMI= Body mass index is 27.4 kg/m .   General: well appearing, no acute distress, pleasant and conversant,   Mental Status/neuro: alert and oriented  Face: symmetrical, normal facial color  Visions:  Extraocular movements are normal. There is no ptosis  Eyes: anicteric, no proptosis or lid lag  Resp: no acute distress        Labs : I reviewed data from epic and extract and summarize the pertinent data here.      1/9/2020 12:16   Sodium 139   Potassium 4.2   Chloride 108   Carbon Dioxide 28   Urea Nitrogen 18   Creatinine 0.96   GFR Estimate >90   GFR Estimate If Black >90   Calcium 9.3   Anion Gap 3   Adrenal Corticotropin 13   Alpha Subunit Pituitary Tumor Marker 0.2   Cortisol Serum 6.4   FSH 3.0   Growth Hormone 0.2   Prolactin 6   T4 Free 0.87   Testosterone Total 241   TSH 0.57   Glucose 76   Ins Growth Factor 1 42 (L)   Lutropin 1.7     Lab Results   Component Value Date     01/09/2020      Lab Results   Component Value Date    POTASSIUM 4.2 01/09/2020     Lab Results   Component Value Date    CHLORIDE 108 01/09/2020     Lab Results   Component Value Date    MARK 9.3 01/09/2020     Lab Results   Component Value Date    CO2 28 01/09/2020     Lab Results   Component Value Date    BUN 18 01/09/2020     Lab Results   Component Value Date    CR 0.96 01/09/2020     Lab Results   Component Value Date    GLC 76 01/09/2020     Lab Results   Component Value Date    TSH 0.57 01/09/2020     Lab Results   Component Value Date    T4 0.87 01/09/2020     Lab Results   Component  Value Date    LH 1.7 01/09/2020     Lab Results   Component Value Date    FSH 3.0 01/09/2020     Lab Results   Component Value Date    PROLACTIN 6 01/09/2020           MRI Brain: I personally reviewed the original images and agree with the below reports.   Results for orders placed in visit on 01/09/20   MRI Brain with & without gadolinium with pituitary protocol [IRR062]    Narrative Brain/ Pituitary MRI without and with contrast    History: Pituitary resection, benign neoplasm of pituitary gland and  craniopharyngeal duct.    Comparison:  10/23/2019 preoperative pituitary MRI.    Technique: Axial diffusion and FLAIR images of the whole brain  obtained without intravenous contrast. Sagittal T1 and T2-weighted,  coronal T2-weighted, coronal T1-weighted images with focus on the  sella were obtained without intravenous contrast. Post intravenous  contrast using gadolinium coronal and sagittal T1-weighted images were  obtained focused on the sella. Dynamic postcontrast coronal  T1-weighted images were also obtained.    Contrast: 10 mL Gadovist.    Findings:     Postoperative changes of the transnasal endoscopic excision of  pituitary macroadenoma. No residual mass is noted. Pituitary stalk is  deviated to the left.    The optic chiasm appears intact and not displaced.    The major cavernous carotid vascular flow-voids appear patent.      FLAIR images through the whole brain are unremarkable, and demonstrate  no mass effect, midline shift, or significant enlargement of the  ventricles.      Impression Impression: No evidence of residual mass within the sella.    I have personally reviewed the examination and initial interpretation  and I agree with the findings.    AMRIT LOPEZ MD

## 2025-06-12 DIAGNOSIS — E23.0 HYPOPITUITARISM: ICD-10-CM

## 2025-06-12 RX ORDER — LEVOTHYROXINE SODIUM 112 UG/1
112 TABLET ORAL DAILY
Qty: 90 TABLET | Refills: 3 | Status: SHIPPED | OUTPATIENT
Start: 2025-06-12

## 2025-06-12 NOTE — TELEPHONE ENCOUNTER
Last Written Prescription:  levothyroxine (SYNTHROID/LEVOTHROID) 112 MCG tablet 90 tablet 3 6/6/2024     ----------------------  Last Visit Date: 6/11/25  Future Visit Date: none  ----------------------      Refill decision: Medication unable to be refilled by RN due to:  Abnormal labs/test: and Other:  Diagnosis does not match medication indication, cannot fill per protocol.     Chanelle Alicea RN  [facility] Triage & Med Refill Team           Request from pharmacy:  Requested Prescriptions   Pending Prescriptions Disp Refills    levothyroxine (SYNTHROID/LEVOTHROID) 112 MCG tablet [Pharmacy Med Name: LEVOTHYROXINE 112 MCG TABLET] 30 tablet 11     Sig: TAKE 1 TABLET BY MOUTH DAILY       Thyroid Protocol Failed - 6/12/2025 10:41 AM        Failed - Recent (12 month) or future (90 days) visit with authorizing provider's specialty (provided they have been seen in the past 15 months)     The patient must have completed an in-person or virtual visit within the past 12 months or has a future visit scheduled within the next 90 days with the authorizing provider s specialty.  Urgent care and e-visits do not qualify as an office visit for this protocol.          Failed - Medication indicated for associated diagnosis     Medication is associated with one or more of the following diagnoses:  Hypothyroidism  Thyroid stimulating hormone suppression therapy  Thyroid cancer  Acquired atrophy of thyroid          Failed - Normal TSH on file in past 12 months     Recent Labs   Lab Test 06/04/25  0758   TSH 0.26*              Passed - Patient is 12 years or older        Passed - Medication is active on med list and the sig matches. RN to manually verify dose and sig if red X/fail.     If the protocol passes (green check), you do not need to verify med dose and sig.    A prescription matches if they are the same clinical intention.    For Example: once daily and every morning are the same.    The protocol can not  identify upper and lower case letters as matching and will fail.     For Example: Take 1 tablet (50 mg) by mouth daily     TAKE 1 TABLET (50 MG) BY MOUTH DAILY    For all fails (red x), verify dose and sig.    If the refill does match what is on file, the RN can still proceed to approve the refill request.       If they do not match, route to the appropriate provider.

## 2025-07-19 ENCOUNTER — HEALTH MAINTENANCE LETTER (OUTPATIENT)
Age: 45
End: 2025-07-19

## (undated) DEVICE — SU CHROMIC 4-0 SH 27" G121H

## (undated) DEVICE — BUR BALL 4MM DIAMOND 15CM ANSPACH MA15-4D

## (undated) DEVICE — DRAPE SHEET REV FOLD 3/4 9349

## (undated) DEVICE — DECANTER BAG 2002S

## (undated) DEVICE — SUCTION MANIFOLD DORNOCH ULTRA CART UL-CL500

## (undated) DEVICE — ESU ELEC BLADE 6" COATED E1450-6

## (undated) DEVICE — COVER CAMERA VIDEO LASER 9X96" 04-CC227

## (undated) DEVICE — DRAPE POUCH INSTRUMENT 1018

## (undated) DEVICE — ESU GROUND PAD ADULT W/CORD E7507

## (undated) DEVICE — CATH TRAY FOLEY SURESTEP 16FR W/TMP PRB STLK LATEX A319416AM

## (undated) DEVICE — TUBE NASOGASTRIC 18FR 48" 2 LUMEN 8888266148

## (undated) DEVICE — SPLINT NASAL DOYLE BREATHEASY 20-10500

## (undated) DEVICE — SYR 03ML LL W/O NDL 309657

## (undated) DEVICE — ENDO SHEATH STORZ SHARPSITE ENDOSCRUB 0DEG 4MM 1912000

## (undated) DEVICE — PACK NEURO MINOR UMMC SNE32MNMU4

## (undated) DEVICE — ESU ELEC NDL 6" COATED/INSULATED E1465-6

## (undated) DEVICE — DRAPE SHEET MED 44X70" 9355

## (undated) DEVICE — PACK GOWN 3/PK DISP XL SBA32GPFCB

## (undated) DEVICE — MARKER SPHERES PASSIVE MEDT PACK 5 8801075

## (undated) DEVICE — ENDO SHEATH STORZ SHARPSITE ENDOSCRUB 30DEG 4MM 1912010

## (undated) DEVICE — SPONGE SURGIFOAM 100 1974

## (undated) DEVICE — SYR EAR BULB 3OZ 0035830

## (undated) DEVICE — SPONGE COTTONOID 1/2X3" 80-1407

## (undated) DEVICE — DECANTER VIAL 2006S

## (undated) DEVICE — SU CHROMIC 4-0 RB-1 27" U203H

## (undated) DEVICE — APPLICATOR EXTENDED TIP DURASEAL 8CM 205108

## (undated) DEVICE — SPONGE COTTONOID 1/2X1/2" 80-1400

## (undated) DEVICE — SYR 10ML FINGER CONTROL W/O NDL 309695

## (undated) DEVICE — SOL WATER IRRIG 1000ML BOTTLE 2F7114

## (undated) DEVICE — WIPE INSTRUMENT MEROCEL 400200

## (undated) DEVICE — GLOVE PROTEXIS MICRO 7.0  2D73PM70

## (undated) DEVICE — ESU SUCTION CAUTERY 10FR FOOT CONTROL E2505-10FR

## (undated) DEVICE — DRSG TELFA 3X8" 1238

## (undated) DEVICE — SURGICEL HEMOSTAT 4X8" 1952

## (undated) DEVICE — BLADE SHAVER SERRATED 4MM ROTATE 1884002HRE

## (undated) DEVICE — NDL 25GA 2"  8881200441

## (undated) DEVICE — TUBING IRRIGATOR STRAIGHTSHOT XPS 1895522

## (undated) DEVICE — ESU MINI EPIDURAL VEIN SEALER AQUAMANTIS 3.4MM 23-314-1

## (undated) DEVICE — SYR 10ML LL W/O NDL 302995

## (undated) DEVICE — LINEN TOWEL PACK X30 5481

## (undated) DEVICE — DRAPE WARMER 66X44" ORS-300

## (undated) DEVICE — BLADE KNIFE SURG 11 371111

## (undated) DEVICE — RX BACITRACIN OINTMENT 0.9G 1/32OZ CUR001109

## (undated) DEVICE — SU ETHILON 3-0 PS-1 18" 1663H

## (undated) DEVICE — NDL BLUNT 18GA 1" W/O FILTER 305181

## (undated) DEVICE — PIN SKULL MAYFIELD ADULT TITANIUM 3/PK A1120

## (undated) DEVICE — LINEN TOWEL PACK X6 WHITE 5487

## (undated) DEVICE — ENDO INSERT ESU BIPOLAR FCP STORZ VERTICAL CLOSING 28164FGL

## (undated) DEVICE — SPONGE SURGIFOAM 01GM POWDER 1978

## (undated) DEVICE — SUCTION CATH AIRLIFE TRI-FLO W/CONTROL PORT 14FR  T60C

## (undated) DEVICE — SOL RINGERS LACTATED 1000ML BAG 07953-09

## (undated) RX ORDER — PROPOFOL 10 MG/ML
INJECTION, EMULSION INTRAVENOUS
Status: DISPENSED
Start: 2019-11-01

## (undated) RX ORDER — LABETALOL HYDROCHLORIDE 5 MG/ML
INJECTION, SOLUTION INTRAVENOUS
Status: DISPENSED
Start: 2019-11-01

## (undated) RX ORDER — PHENYLEPHRINE HCL IN 0.9% NACL 1 MG/10 ML
SYRINGE (ML) INTRAVENOUS
Status: DISPENSED
Start: 2019-11-01

## (undated) RX ORDER — SODIUM CHLORIDE 9 MG/ML
INJECTION, SOLUTION INTRAVENOUS
Status: DISPENSED
Start: 2019-11-02

## (undated) RX ORDER — ACETAMINOPHEN 325 MG/1
TABLET ORAL
Status: DISPENSED
Start: 2019-11-02

## (undated) RX ORDER — ESMOLOL HYDROCHLORIDE 10 MG/ML
INJECTION INTRAVENOUS
Status: DISPENSED
Start: 2019-11-01

## (undated) RX ORDER — FENTANYL CITRATE 50 UG/ML
INJECTION, SOLUTION INTRAMUSCULAR; INTRAVENOUS
Status: DISPENSED
Start: 2019-11-01

## (undated) RX ORDER — FENTANYL CITRATE 50 UG/ML
INJECTION, SOLUTION INTRAMUSCULAR; INTRAVENOUS
Status: DISPENSED
Start: 2019-11-02

## (undated) RX ORDER — MEPERIDINE HYDROCHLORIDE 25 MG/ML
INJECTION INTRAMUSCULAR; INTRAVENOUS; SUBCUTANEOUS
Status: DISPENSED
Start: 2019-11-01

## (undated) RX ORDER — ONDANSETRON 2 MG/ML
INJECTION INTRAMUSCULAR; INTRAVENOUS
Status: DISPENSED
Start: 2019-11-01

## (undated) RX ORDER — LIDOCAINE HYDROCHLORIDE 20 MG/ML
INJECTION, SOLUTION EPIDURAL; INFILTRATION; INTRACAUDAL; PERINEURAL
Status: DISPENSED
Start: 2019-11-01

## (undated) RX ORDER — OXYMETAZOLINE HYDROCHLORIDE 0.05 G/100ML
SPRAY NASAL
Status: DISPENSED
Start: 2019-11-01

## (undated) RX ORDER — LIDOCAINE HYDROCHLORIDE AND EPINEPHRINE 10; 10 MG/ML; UG/ML
INJECTION, SOLUTION INFILTRATION; PERINEURAL
Status: DISPENSED
Start: 2019-11-01